# Patient Record
Sex: FEMALE | Race: WHITE | NOT HISPANIC OR LATINO
[De-identification: names, ages, dates, MRNs, and addresses within clinical notes are randomized per-mention and may not be internally consistent; named-entity substitution may affect disease eponyms.]

---

## 2017-02-14 ENCOUNTER — APPOINTMENT (OUTPATIENT)
Dept: CARDIOLOGY | Facility: CLINIC | Age: 67
End: 2017-02-14

## 2017-02-24 ENCOUNTER — EMERGENCY (EMERGENCY)
Facility: HOSPITAL | Age: 67
LOS: 1 days | End: 2017-02-24
Payer: MEDICARE

## 2017-02-24 PROCEDURE — 99284 EMERGENCY DEPT VISIT MOD MDM: CPT

## 2017-10-10 ENCOUNTER — APPOINTMENT (OUTPATIENT)
Dept: CARDIOLOGY | Facility: CLINIC | Age: 67
End: 2017-10-10
Payer: MEDICARE

## 2017-10-10 PROCEDURE — 93306 TTE W/DOPPLER COMPLETE: CPT

## 2017-10-10 PROCEDURE — 93880 EXTRACRANIAL BILAT STUDY: CPT

## 2017-10-16 ENCOUNTER — TRANSCRIPTION ENCOUNTER (OUTPATIENT)
Age: 67
End: 2017-10-16

## 2017-10-19 ENCOUNTER — APPOINTMENT (OUTPATIENT)
Dept: CARDIOLOGY | Facility: CLINIC | Age: 67
End: 2017-10-19
Payer: MEDICARE

## 2017-10-19 PROCEDURE — 99214 OFFICE O/P EST MOD 30 MIN: CPT

## 2017-10-19 PROCEDURE — 93000 ELECTROCARDIOGRAM COMPLETE: CPT

## 2017-12-21 ENCOUNTER — RX RENEWAL (OUTPATIENT)
Age: 67
End: 2017-12-21

## 2018-01-23 ENCOUNTER — MEDICATION RENEWAL (OUTPATIENT)
Age: 68
End: 2018-01-23

## 2018-03-27 ENCOUNTER — RECORD ABSTRACTING (OUTPATIENT)
Age: 68
End: 2018-03-27

## 2018-04-05 ENCOUNTER — APPOINTMENT (OUTPATIENT)
Dept: CARDIOLOGY | Facility: CLINIC | Age: 68
End: 2018-04-05
Payer: MEDICARE

## 2018-04-05 VITALS
OXYGEN SATURATION: 98 % | HEART RATE: 84 BPM | SYSTOLIC BLOOD PRESSURE: 120 MMHG | WEIGHT: 140 LBS | BODY MASS INDEX: 23.9 KG/M2 | HEIGHT: 64 IN | DIASTOLIC BLOOD PRESSURE: 70 MMHG

## 2018-04-05 PROCEDURE — 99214 OFFICE O/P EST MOD 30 MIN: CPT

## 2018-04-05 PROCEDURE — 93000 ELECTROCARDIOGRAM COMPLETE: CPT

## 2018-04-09 ENCOUNTER — MEDICATION RENEWAL (OUTPATIENT)
Age: 68
End: 2018-04-09

## 2018-07-11 ENCOUNTER — MEDICATION RENEWAL (OUTPATIENT)
Age: 68
End: 2018-07-11

## 2018-07-19 ENCOUNTER — MEDICATION RENEWAL (OUTPATIENT)
Age: 68
End: 2018-07-19

## 2018-09-07 ENCOUNTER — RX RENEWAL (OUTPATIENT)
Age: 68
End: 2018-09-07

## 2018-09-13 ENCOUNTER — MEDICATION RENEWAL (OUTPATIENT)
Age: 68
End: 2018-09-13

## 2018-10-09 ENCOUNTER — APPOINTMENT (OUTPATIENT)
Dept: CARDIOLOGY | Facility: CLINIC | Age: 68
End: 2018-10-09
Payer: MEDICARE

## 2018-10-09 PROCEDURE — 93880 EXTRACRANIAL BILAT STUDY: CPT

## 2018-10-09 PROCEDURE — 93306 TTE W/DOPPLER COMPLETE: CPT

## 2018-10-16 ENCOUNTER — RECORD ABSTRACTING (OUTPATIENT)
Age: 68
End: 2018-10-16

## 2018-10-18 ENCOUNTER — APPOINTMENT (OUTPATIENT)
Dept: CARDIOLOGY | Facility: CLINIC | Age: 68
End: 2018-10-18
Payer: MEDICARE

## 2018-10-18 VITALS
HEART RATE: 64 BPM | DIASTOLIC BLOOD PRESSURE: 70 MMHG | WEIGHT: 143 LBS | SYSTOLIC BLOOD PRESSURE: 110 MMHG | OXYGEN SATURATION: 98 % | HEIGHT: 64 IN | BODY MASS INDEX: 24.41 KG/M2

## 2018-10-18 PROCEDURE — 93000 ELECTROCARDIOGRAM COMPLETE: CPT

## 2018-10-18 PROCEDURE — 99214 OFFICE O/P EST MOD 30 MIN: CPT

## 2018-10-18 RX ORDER — METOPROLOL SUCCINATE 50 MG/1
50 TABLET, EXTENDED RELEASE ORAL
Refills: 0 | Status: DISCONTINUED | COMMUNITY
End: 2018-10-18

## 2018-11-13 ENCOUNTER — APPOINTMENT (OUTPATIENT)
Dept: CARDIOLOGY | Facility: CLINIC | Age: 68
End: 2018-11-13
Payer: MEDICARE

## 2018-11-16 ENCOUNTER — RECORD ABSTRACTING (OUTPATIENT)
Age: 68
End: 2018-11-16

## 2018-11-16 PROCEDURE — 93224 XTRNL ECG REC UP TO 48 HRS: CPT

## 2018-11-29 ENCOUNTER — APPOINTMENT (OUTPATIENT)
Dept: CARDIOLOGY | Facility: CLINIC | Age: 68
End: 2018-11-29
Payer: MEDICARE

## 2018-11-29 ENCOUNTER — RECORD ABSTRACTING (OUTPATIENT)
Age: 68
End: 2018-11-29

## 2018-11-29 VITALS
HEIGHT: 64 IN | OXYGEN SATURATION: 98 % | WEIGHT: 140 LBS | BODY MASS INDEX: 23.9 KG/M2 | DIASTOLIC BLOOD PRESSURE: 70 MMHG | SYSTOLIC BLOOD PRESSURE: 120 MMHG | HEART RATE: 62 BPM

## 2018-11-29 DIAGNOSIS — F51.01 PRIMARY INSOMNIA: ICD-10-CM

## 2018-11-29 DIAGNOSIS — E03.9 HYPOTHYROIDISM, UNSPECIFIED: ICD-10-CM

## 2018-11-29 PROCEDURE — 99214 OFFICE O/P EST MOD 30 MIN: CPT

## 2018-12-18 ENCOUNTER — RECORD ABSTRACTING (OUTPATIENT)
Age: 68
End: 2018-12-18

## 2018-12-20 ENCOUNTER — APPOINTMENT (OUTPATIENT)
Dept: CARDIOLOGY | Facility: CLINIC | Age: 68
End: 2018-12-20

## 2018-12-28 ENCOUNTER — MEDICATION RENEWAL (OUTPATIENT)
Age: 68
End: 2018-12-28

## 2018-12-29 ENCOUNTER — RX RENEWAL (OUTPATIENT)
Age: 68
End: 2018-12-29

## 2019-01-04 ENCOUNTER — RX RENEWAL (OUTPATIENT)
Age: 69
End: 2019-01-04

## 2019-01-30 ENCOUNTER — APPOINTMENT (OUTPATIENT)
Dept: CARDIOLOGY | Facility: CLINIC | Age: 69
End: 2019-01-30
Payer: MEDICARE

## 2019-01-30 VITALS
SYSTOLIC BLOOD PRESSURE: 130 MMHG | HEART RATE: 60 BPM | BODY MASS INDEX: 24.75 KG/M2 | WEIGHT: 145 LBS | HEIGHT: 64 IN | DIASTOLIC BLOOD PRESSURE: 76 MMHG

## 2019-01-30 DIAGNOSIS — R51 HEADACHE: ICD-10-CM

## 2019-01-30 DIAGNOSIS — F32.5 MAJOR DEPRESSIVE DISORDER, SINGLE EPISODE, IN FULL REMISSION: ICD-10-CM

## 2019-01-30 DIAGNOSIS — J30.2 OTHER SEASONAL ALLERGIC RHINITIS: ICD-10-CM

## 2019-01-30 PROCEDURE — 99214 OFFICE O/P EST MOD 30 MIN: CPT

## 2019-01-30 RX ORDER — SERTRALINE HYDROCHLORIDE 100 MG/1
100 TABLET, FILM COATED ORAL
Refills: 0 | Status: ACTIVE | COMMUNITY
Start: 2019-01-30

## 2019-01-30 RX ORDER — UBIDECARENONE/VIT E ACET 100MG-5
1000 CAPSULE ORAL
Refills: 0 | Status: ACTIVE | COMMUNITY

## 2019-01-30 RX ORDER — FEXOFENADINE HYDROCHLORIDE 180 MG/1
180 TABLET, FILM COATED ORAL
Refills: 0 | Status: ACTIVE | COMMUNITY

## 2019-01-30 RX ORDER — SERTRALINE HYDROCHLORIDE 100 MG/1
100 TABLET, FILM COATED ORAL DAILY
Refills: 0 | Status: DISCONTINUED | COMMUNITY
End: 2019-01-30

## 2019-01-30 RX ORDER — CALCIUM CARBONATE/VITAMIN D3 600MG-5MCG
600-200 TABLET ORAL DAILY
Refills: 0 | Status: ACTIVE | COMMUNITY

## 2019-01-30 RX ORDER — MOMETASONE FUROATE MONOHYDRATE 50 UG/1
50 SPRAY, METERED NASAL DAILY
Refills: 0 | Status: DISCONTINUED | COMMUNITY
End: 2019-01-30

## 2019-01-30 RX ORDER — PROPAFENONE HYDROCHLORIDE 225 MG/1
225 TABLET, FILM COATED ORAL
Qty: 180 | Refills: 1 | Status: DISCONTINUED | COMMUNITY
End: 2019-01-30

## 2019-01-30 RX ORDER — ALPRAZOLAM 0.25 MG/1
0.25 TABLET ORAL
Refills: 0 | Status: ACTIVE | COMMUNITY

## 2019-01-30 RX ORDER — WARFARIN 3 MG/1
3 TABLET ORAL
Refills: 0 | Status: ACTIVE | COMMUNITY
Start: 2019-01-30

## 2019-02-01 RX ORDER — CARVEDILOL 3.12 MG/1
3.12 TABLET, FILM COATED ORAL TWICE DAILY
Qty: 180 | Refills: 1 | Status: DISCONTINUED | COMMUNITY
Start: 2018-09-07 | End: 2019-02-01

## 2019-02-01 NOTE — DISCUSSION/SUMMARY
[FreeTextEntry1] : 68-year-old female past medical history as detailed above with the following recommendations:\par \par Atrial fibrillation, paroxysmal, documented on the Holter monitor or an event monitor. She is now him anticoagulation with warfarin tolerating it well.\par \par History of nonobstructive coronary artery disease, chest tightness and dyspnea on exertion, recommend nuclear stress testing.\par \par Hypertension presently well controlled on her current regimen.\par \par Hyperlipidemia, in the presence of nonobstructive CAD, should consider statin therapy.\par \par She will continue to follow with her endocrinologist for thyroid disease.\par \par She will have subsequent followup in the office after testing is completed.

## 2019-02-01 NOTE — PHYSICAL EXAM
[Normal Appearance] : normal appearance [Eyelids - No Xanthelasma] : the eyelids demonstrated no xanthelasmas [No Oral Pallor] : no oral pallor [Normal Jugular Venous V Waves Present] : normal jugular venous V waves present [Respiration, Rhythm And Depth] : normal respiratory rhythm and effort [Exaggerated Use Of Accessory Muscles For Inspiration] : no accessory muscle use [Auscultation Breath Sounds / Voice Sounds] : lungs were clear to auscultation bilaterally [Heart Rate And Rhythm] : heart rate and rhythm were normal [Heart Sounds] : normal S1 and S2 [Edema] : no peripheral edema present [Bowel Sounds] : normal bowel sounds [Abdomen Soft] : soft [Abdomen Tenderness] : non-tender [Abnormal Walk] : normal gait [Petechial Hemorrhages (___cm)] : no petechial hemorrhages [] : no rash [No Anxiety] : not feeling anxious

## 2019-02-01 NOTE — HISTORY OF PRESENT ILLNESS
[FreeTextEntry1] : \par 68-year-old female with past medical history significant for paroxysmal atrial fibrillation, minimal valvular regurgitation, factor XI deficiency, nonobstructive coronary artery disease, prior history of breast cancer.\par \par Recently she had a Holter monitor applied which revealed intermittent paroxysmal atrial fibrillation, minimum rate 51 and maximum 102.  She has rare to occasional premature atrial beats, rare sequentials, 2 to 9 beats at 148 beats per minute.  She had rare ventricular premature beats, no couplets.  \par She had no symptoms during monitoring period.  \par \par Echocardiogram 10-18 revealed normal chamber sizes, LVEF was normal at 60%.  She had no segmental wall motion abnormalities.  She had thickened mitral valve with minimal mitral regurgitation and thickened aortic valve with minimal to mild aortic valve regurgitation.  She had minimal tricuspid and pulmonic regurgitation and borderline pulmonary hypertension.\par \par She had no significant obstructive carotid disease by Doppler, antegrade flow with normal velocities in both vertebral arteries 10-18.\par \par \par She had lab work performed on November 19, 2018 where glucose, creatinine, and electrolytes were all within normal limits so were the liver enzymes, B12, and folic acid.  Her magnesium was normal at 2.3, folic acid over 20, HDL cholesterol 77, .  Thyroid was within normal limits.  Hemoglobin A1c was normal at 5.6.  CBC was unremarkable.  Urine was unremarkable. \par \par She is presently asymptomatic for atrial fibrillation and is maintained on Rythmol 325 mg q.12. She states she overall feels better on the higher dose of Rythmol. She is also on Toprol 100 mg daily, for ventricular rate control. She was started on anticoagulation with warfarin by her hematologist. She is tolerating this well without complications.\par \par She describes new chest discomfort, described as tightness with exertion. There is shortness of breath with exertion but no edema, PND, orthopnea, jaw or arm pain.\par \par SAVE NOTE\par She had episode of atrial fibrillation in 2008 when she was in [place]. She has been doing well since then. At that time, she also had an angiogram complicated by cardiac arrest. \par CT of the heart performed in 2010 showed normal coronaries and previous lesion about 10% in the mid LAD. She has multiple allergies to medicines including Aleve that gives her angioedema, and she sees allergist and pulmonologist, Dr. Correia. She also takes the Dulera. \par \par We reviewed all the medications, reconciled her list of medicines with ours. Her hypothyroidism is managed by endocrinologist, Dr. Huang. . \par For breast neoplasm, she sees Dr. Solomon for CA breast, [id number] carcinoma of left breast. \par She has factor XI deficiency. Her daughter also has a near form of Factor XI deficiency. \par She has headaches and visual disturbances and sees ophthalmologist, Dr. Roberts. \par She has had no significant attacks of PSVT on present medical regimen, and this has been stable. She will call us if she has any new symptoms. \par

## 2019-03-01 ENCOUNTER — APPOINTMENT (OUTPATIENT)
Dept: CARDIOLOGY | Facility: CLINIC | Age: 69
End: 2019-03-01
Payer: MEDICARE

## 2019-03-01 PROCEDURE — A9502: CPT

## 2019-03-01 PROCEDURE — 78452 HT MUSCLE IMAGE SPECT MULT: CPT

## 2019-03-01 PROCEDURE — 93015 CV STRESS TEST SUPVJ I&R: CPT

## 2019-03-01 RX ORDER — ZOLPIDEM TARTRATE 5 MG/1
5 TABLET ORAL
Qty: 30 | Refills: 0 | Status: DISCONTINUED | COMMUNITY
End: 2019-03-01

## 2019-03-01 RX ORDER — POTASSIUM CHLORIDE 750 MG/1
10 TABLET, EXTENDED RELEASE ORAL EVERY OTHER DAY
Qty: 45 | Refills: 3 | Status: DISCONTINUED | COMMUNITY
Start: 1900-01-01 | End: 2019-03-01

## 2019-03-07 ENCOUNTER — RX RENEWAL (OUTPATIENT)
Age: 69
End: 2019-03-07

## 2019-03-13 ENCOUNTER — APPOINTMENT (OUTPATIENT)
Dept: CARDIOLOGY | Facility: CLINIC | Age: 69
End: 2019-03-13
Payer: MEDICARE

## 2019-03-13 VITALS
HEART RATE: 65 BPM | OXYGEN SATURATION: 98 % | HEIGHT: 64 IN | SYSTOLIC BLOOD PRESSURE: 102 MMHG | WEIGHT: 142 LBS | DIASTOLIC BLOOD PRESSURE: 60 MMHG | BODY MASS INDEX: 24.24 KG/M2

## 2019-03-13 PROCEDURE — 99214 OFFICE O/P EST MOD 30 MIN: CPT

## 2019-03-13 RX ORDER — CARVEDILOL 3.12 MG/1
3.12 TABLET, FILM COATED ORAL TWICE DAILY
Refills: 0 | Status: DISCONTINUED | COMMUNITY
End: 2019-03-13

## 2019-04-03 NOTE — HISTORY OF PRESENT ILLNESS
[FreeTextEntry1] : \par 68-year-old female with past medical history significant for paroxysmal atrial fibrillation, minimal valvular regurgitation, factor XI deficiency, nonobstructive coronary artery disease, prior history of breast cancer.\par \par Recently she had a Holter monitor applied which revealed intermittent paroxysmal atrial fibrillation, minimum rate 51 and maximum 102.  She has rare to occasional premature atrial beats, rare sequentials, 2 to 9 beats at 148 beats per minute.  She had rare ventricular premature beats, no couplets.  \par She had no symptoms during monitoring period.  \par \par Echocardiogram 10-18 revealed normal chamber sizes, LVEF was normal at 60%.  She had no segmental wall motion abnormalities.  She had thickened mitral valve with minimal mitral regurgitation and thickened aortic valve with minimal to mild aortic valve regurgitation.  She had minimal tricuspid and pulmonic regurgitation and borderline pulmonary hypertension.\par \par She had no significant obstructive carotid disease by Doppler, antegrade flow with normal velocities in both vertebral arteries 10-18.\par \par \par She had lab work performed on November 19, 2018 where glucose, creatinine, and electrolytes were all within normal limits so were the liver enzymes, B12, and folic acid.  Her magnesium was normal at 2.3, folic acid over 20, HDL cholesterol 77, .  Thyroid was within normal limits.  Hemoglobin A1c was normal at 5.6.  CBC was unremarkable.  Urine was unremarkable. \par \par She is presently asymptomatic for atrial fibrillation and is maintained on Rythmol 325 mg q.12. She states she overall feels better on the higher dose of Rythmol. She is also on Toprol 100 mg daily, for ventricular rate control.   She is also on Coreg 3.125 BID, which she will stop. She was started on anticoagulation with warfarin by her hematologist. She is tolerating this well without complications.\par \par She described new chest discomfort, described as tightness with exertion. There is shortness of breath with exertion but no edema, PND, orthopnea, jaw or arm pain. \par \par She had nuclear stress test given the above symptoms on March 1, 2019, overall she had no evidence of ischemia by EKG. Myocardial perfusion scan revealed a small mild defect in the apical anteroseptal region which was fixed suggestive of breast attenuation artifact.\par \par In the interim she has not had recurrence of chest discomfort.\par \par SAVE NOTE\par She had episode of atrial fibrillation in 2008 when she was in [place]. She has been doing well since then. At that time, she also had an angiogram complicated by cardiac arrest. \par CT of the heart performed in 2010 showed normal coronaries and previous lesion about 10% in the mid LAD. She has multiple allergies to medicines including Aleve that gives her angioedema, and she sees allergist and pulmonologist, Dr. Correia. She also takes the Dulera. \par \par We reviewed all the medications, reconciled her list of medicines with ours. Her hypothyroidism is managed by endocrinologist, Dr. Huang. . \par For breast neoplasm, she sees Dr. Solomon for CA breast, T2N0M0 carcinoma of left breast. \par She has factor XI deficiency. Her daughter also has a near form of Factor XI deficiency. \par She has headaches and visual disturbances and sees ophthalmologist, Dr. Roberts. \par She has had no significant attacks of PSVT on present medical regimen, and this has been stable. She will call us if she has any new symptoms. \par

## 2019-04-03 NOTE — DISCUSSION/SUMMARY
[FreeTextEntry1] : 68-year-old female past medical history as detailed above with the following recommendations:\par \par Atrial fibrillation, paroxysmal, documented on the Holter monitor or an event monitor. She is now on anticoagulation with warfarin tolerating it well.\par \par History of nonobstructive coronary artery disease, chest tightness and dyspnea on exertion, this has not recurred. She had negative nuclear stress testing.\par \par Hypertension presently well controlled on her current regimen. Stop CoReg, as there is no need to be on two separate beta blockers. \par \par Hyperlipidemia, in the presence of nonobstructive CAD, should consider statin therapy, she will think about it.\par \par She will continue to follow with her endocrinologist for thyroid disease.\par \par She will have subsequent followup in 3-4 months or as needed for symptoms.

## 2019-06-05 ENCOUNTER — MEDICATION RENEWAL (OUTPATIENT)
Age: 69
End: 2019-06-05

## 2019-07-03 ENCOUNTER — APPOINTMENT (OUTPATIENT)
Dept: FAMILY MEDICINE | Facility: CLINIC | Age: 69
End: 2019-07-03

## 2019-07-10 ENCOUNTER — APPOINTMENT (OUTPATIENT)
Dept: CARDIOLOGY | Facility: CLINIC | Age: 69
End: 2019-07-10
Payer: MEDICARE

## 2019-07-10 VITALS
OXYGEN SATURATION: 99 % | DIASTOLIC BLOOD PRESSURE: 70 MMHG | BODY MASS INDEX: 24.24 KG/M2 | HEART RATE: 66 BPM | SYSTOLIC BLOOD PRESSURE: 132 MMHG | WEIGHT: 142 LBS | HEIGHT: 64 IN

## 2019-07-10 DIAGNOSIS — E87.6 HYPOKALEMIA: ICD-10-CM

## 2019-07-10 PROCEDURE — 99214 OFFICE O/P EST MOD 30 MIN: CPT

## 2019-07-10 RX ORDER — POTASSIUM CHLORIDE 1500 MG/1
20 TABLET, EXTENDED RELEASE ORAL
Qty: 45 | Refills: 3 | Status: DISCONTINUED | COMMUNITY
End: 2019-07-10

## 2019-07-10 NOTE — PHYSICAL EXAM
[Normal Appearance] : normal appearance [Eyelids - No Xanthelasma] : the eyelids demonstrated no xanthelasmas [Normal Jugular Venous V Waves Present] : normal jugular venous V waves present [No Oral Pallor] : no oral pallor [Respiration, Rhythm And Depth] : normal respiratory rhythm and effort [Auscultation Breath Sounds / Voice Sounds] : lungs were clear to auscultation bilaterally [Exaggerated Use Of Accessory Muscles For Inspiration] : no accessory muscle use [Edema] : no peripheral edema present [Heart Sounds] : normal S1 and S2 [Heart Rate And Rhythm] : heart rate and rhythm were normal [Bowel Sounds] : normal bowel sounds [Abdomen Soft] : soft [Abdomen Tenderness] : non-tender [Petechial Hemorrhages (___cm)] : no petechial hemorrhages [] : no rash [Abnormal Walk] : normal gait [No Anxiety] : not feeling anxious

## 2019-07-10 NOTE — DISCUSSION/SUMMARY
[FreeTextEntry1] : 69-year-old female past medical history as detailed above with the following recommendations:\par \par Atrial fibrillation, paroxysmal, documented on the Holter monitor or an event monitor. She is on anticoagulation with warfarin tolerating it well, normal CBC 3-19.\par \par History of nonobstructive coronary artery disease. She had negative nuclear stress testing. \par \par Hypertension presently well controlled on her current regimen. \par \par Hyperlipidemia, in the presence of nonobstructive CAD. She would like a CT calcium score to gauge if she would like to start statins. \par \par She will continue to follow with her endocrinologist for thyroid disease.\par \par She will be seeking new PCP.\par \par She will have subsequent followup in 3-4 months or as needed for symptoms.

## 2019-07-10 NOTE — HISTORY OF PRESENT ILLNESS
[FreeTextEntry1] : \par 69-year-old female with past medical history significant for paroxysmal atrial fibrillation, minimal valvular regurgitation, factor XI deficiency, nonobstructive coronary artery disease, prior history of breast cancer.\par \par Overall Johanne feels well. She denies any change in current symptoms. There is chronic mild HINTON with moderate to high levels of exertion.  She denies any chest pain, edema, PND, orthopnea, jaw or arm pain. \par \par Holter monitor applied 1-19 which revealed intermittent paroxysmal atrial fibrillation, minimum rate 51 and maximum 102.  She has rare to occasional premature atrial beats, rare sequentials, 2 to 9 beats at 148 beats per minute.  She had rare ventricular premature beats, no couplets.  \par She had no symptoms during monitoring period.  \par \par Echocardiogram 10-18 revealed normal chamber sizes, LVEF was normal at 60%.  She had no segmental wall motion abnormalities.  She had thickened mitral valve with minimal mitral regurgitation and thickened aortic valve with minimal to mild aortic valve regurgitation.  She had minimal tricuspid and pulmonic regurgitation and borderline pulmonary hypertension.\par \par She had no significant obstructive carotid disease by Doppler, antegrade flow with normal velocities in both vertebral arteries 10-18.\par \par \par She had lab work performed on 3-14-19 with normal CBC and  November 19, 2018 where glucose, creatinine, and electrolytes were all within normal limits so were the liver enzymes, B12, and folic acid.  Her magnesium was normal at 2.3, folic acid over 20, HDL cholesterol 77, .  Thyroid was within normal limits.  Hemoglobin A1c was normal at 5.6.  CBC was unremarkable.  Urine was unremarkable. \par \par She is presently asymptomatic for atrial fibrillation and is maintained on Rythmol 325 mg q.12. She states she overall feels better on the higher dose of Rythmol. She was also previously on Toprol 100 mg daily, and Coreg 3.125 BID, which was stopped. \par \par She was started on anticoagulation with warfarin by her hematologist. She is tolerating this well without complications.\par \par She had nuclear stress test, March 1, 2019, overall she had no evidence of ischemia by EKG. Myocardial perfusion scan revealed a small mild defect in the apical anteroseptal region which was fixed suggestive of breast attenuation artifact.\par \par \par \par SAVE NOTE\par She had episode of atrial fibrillation in 2008 when she was in [place]. She has been doing well since then. At that time, she also had an angiogram complicated by cardiac arrest. \par CT of the heart performed in 2010 showed normal coronaries and previous lesion about 10% in the mid LAD. She has multiple allergies to medicines including Aleve that gives her angioedema, and she sees allergist and pulmonologist, Dr. Correia. She also takes the Dulera. \par \par We reviewed all the medications, reconciled her list of medicines with ours. Her hypothyroidism is managed by endocrinologist, Dr. Huang. . \par For breast neoplasm, she sees Dr. Solomon for CA breast, T2N0M0 carcinoma of left breast. \par She has factor XI deficiency. Her daughter also has a near form of Factor XI deficiency. \par She has headaches and visual disturbances and sees ophthalmologist, Dr. Roberts. \par She has had no significant attacks of PSVT on present medical regimen, and this has been stable. She will call us if she has any new symptoms. \par

## 2019-08-22 ENCOUNTER — MEDICATION RENEWAL (OUTPATIENT)
Age: 69
End: 2019-08-22

## 2019-08-26 ENCOUNTER — RX RENEWAL (OUTPATIENT)
Age: 69
End: 2019-08-26

## 2019-09-26 ENCOUNTER — APPOINTMENT (OUTPATIENT)
Dept: CARDIOLOGY | Facility: CLINIC | Age: 69
End: 2019-09-26
Payer: MEDICARE

## 2019-09-26 ENCOUNTER — MEDICATION RENEWAL (OUTPATIENT)
Age: 69
End: 2019-09-26

## 2019-09-26 ENCOUNTER — NON-APPOINTMENT (OUTPATIENT)
Age: 69
End: 2019-09-26

## 2019-09-26 VITALS
WEIGHT: 142 LBS | HEIGHT: 64 IN | BODY MASS INDEX: 24.24 KG/M2 | HEART RATE: 55 BPM | OXYGEN SATURATION: 98 % | SYSTOLIC BLOOD PRESSURE: 118 MMHG | DIASTOLIC BLOOD PRESSURE: 70 MMHG

## 2019-09-26 PROCEDURE — 93000 ELECTROCARDIOGRAM COMPLETE: CPT

## 2019-09-26 PROCEDURE — 99215 OFFICE O/P EST HI 40 MIN: CPT | Mod: 25

## 2019-09-26 NOTE — DISCUSSION/SUMMARY
[FreeTextEntry1] : 1. non-obstructive CAD: discussed findings of her CT of the heart for CAC score. Total calcium score of 28 Agatston units and 10 year ASCVD Risk score of 9.72%. Discussed risks and benefits of starting statin therapy. She would like to be started on atorvastatin 20mg daily. Follow up labs in 3 months. \par \par 2. PAF: continue Rythmol 325mg BID, Toprol XL 100mg daily and Warfarin ( high risk medication with no signs of toxicity;Goal INR should be 2-3).\par \par 3. HTN: controlled on Toprol XL 100mg daily and triameterene-HCTZ. \par \par Follow up in 3 months.

## 2019-09-26 NOTE — PHYSICAL EXAM
[Well Groomed] : well groomed [General Appearance - Well Developed] : well developed [Normal Appearance] : normal appearance [General Appearance - Well Nourished] : well nourished [General Appearance - In No Acute Distress] : no acute distress [Normal Conjunctiva] : the conjunctiva exhibited no abnormalities [Eyelids - No Xanthelasma] : the eyelids demonstrated no xanthelasmas [Normal Oral Mucosa] : normal oral mucosa [No Oral Pallor] : no oral pallor [FreeTextEntry1] : No JVD, no carotid artery bruits auscultated bilaterally [No Oral Cyanosis] : no oral cyanosis [Respiration, Rhythm And Depth] : normal respiratory rhythm and effort [Exaggerated Use Of Accessory Muscles For Inspiration] : no accessory muscle use [Auscultation Breath Sounds / Voice Sounds] : lungs were clear to auscultation bilaterally [Heart Rate And Rhythm] : heart rate and rhythm were normal [Heart Sounds] : normal S1 and S2 [Murmurs] : no murmurs present [Edema] : no peripheral edema present [Abnormal Walk] : normal gait [Gait - Sufficient For Exercise Testing] : the gait was sufficient for exercise testing [Nail Clubbing] : no clubbing of the fingernails [Cyanosis, Localized] : no localized cyanosis [Skin Color & Pigmentation] : normal skin color and pigmentation [Skin Turgor] : normal skin turgor [] : no rash [Impaired Insight] : insight and judgment were intact [Affect] : the affect was normal [Memory Recent] : recent memory was not impaired

## 2019-09-26 NOTE — REVIEW OF SYSTEMS
[Dyspnea on exertion] : not dyspnea during exertion [Shortness Of Breath] : no shortness of breath [Lower Ext Edema] : no extremity edema [Chest Pain] : no chest pain [Palpitations] : no palpitations [Easy Bleeding] : no tendency for easy bleeding [Easy Bruising] : no tendency for easy bruising [Negative] : Endocrine

## 2019-12-18 ENCOUNTER — APPOINTMENT (OUTPATIENT)
Dept: CARDIOLOGY | Facility: CLINIC | Age: 69
End: 2019-12-18
Payer: MEDICARE

## 2019-12-18 VITALS
HEIGHT: 64 IN | OXYGEN SATURATION: 99 % | DIASTOLIC BLOOD PRESSURE: 70 MMHG | BODY MASS INDEX: 24.41 KG/M2 | HEART RATE: 62 BPM | SYSTOLIC BLOOD PRESSURE: 116 MMHG | WEIGHT: 143 LBS

## 2019-12-18 DIAGNOSIS — I34.0 NONRHEUMATIC MITRAL (VALVE) INSUFFICIENCY: ICD-10-CM

## 2019-12-18 PROCEDURE — 99215 OFFICE O/P EST HI 40 MIN: CPT

## 2019-12-18 RX ORDER — MOMETASONE FUROATE AND FORMOTEROL FUMARATE DIHYDRATE 200; 5 UG/1; UG/1
200-5 AEROSOL RESPIRATORY (INHALATION) TWICE DAILY
Refills: 0 | Status: DISCONTINUED | COMMUNITY
End: 2019-12-18

## 2019-12-18 RX ORDER — FLUTICASONE FUROATE, UMECLIDINIUM BROMIDE AND VILANTEROL TRIFENATATE 100; 62.5; 25 UG/1; UG/1; UG/1
100-62.5-25 POWDER RESPIRATORY (INHALATION)
Refills: 0 | Status: DISCONTINUED | COMMUNITY
End: 2019-12-18

## 2019-12-18 NOTE — DISCUSSION/SUMMARY
[FreeTextEntry1] : 1. non-obstructive CAD: discussed findings of her CT of the heart for CAC score. Total calcium score of 28 Agatston units and 10 year ASCVD Risk score of 9.72%. Tolerating atorvastatin 20mg daily.  LDL 75 on labs from 12/3/2019. Continue statin therapy.\par \par 2. PAF: continue Rythmol 325mg BID (high risk medication with no signs of toxicity), Toprol XL 100mg daily and Warfarin ( high risk medication with no signs of toxicity;Goal INR should be 2-3). No signs of ischemia or prior infarction on pharmacologic nuclear stress test performed March 2019. Normal LV systolic function on echocardiogram from October 2018.\par \par 3. HTN: controlled on Toprol XL 100mg daily and triameterene-HCTZ. \par \par Follow up in 6 months.

## 2019-12-18 NOTE — REVIEW OF SYSTEMS
[Shortness Of Breath] : no shortness of breath [Dyspnea on exertion] : not dyspnea during exertion [Chest Pain] : no chest pain [Lower Ext Edema] : no extremity edema [Palpitations] : no palpitations [Easy Bleeding] : no tendency for easy bleeding [Easy Bruising] : no tendency for easy bruising [Negative] : Endocrine

## 2019-12-18 NOTE — HISTORY OF PRESENT ILLNESS
[FreeTextEntry1] : 69 year old female with history of PAF, HLD, Factor XI deficiency, non-obstructive CAD, prior history of breast cancer presents for follow up. She has no chest pain, SOB, palpitations, abnormal bleeding.

## 2019-12-18 NOTE — PHYSICAL EXAM
[General Appearance - Well Developed] : well developed [Well Groomed] : well groomed [Normal Appearance] : normal appearance [General Appearance - In No Acute Distress] : no acute distress [General Appearance - Well Nourished] : well nourished [Normal Conjunctiva] : the conjunctiva exhibited no abnormalities [Eyelids - No Xanthelasma] : the eyelids demonstrated no xanthelasmas [Normal Oral Mucosa] : normal oral mucosa [No Oral Cyanosis] : no oral cyanosis [No Oral Pallor] : no oral pallor [FreeTextEntry1] : No JVD, no carotid artery bruits auscultated bilaterally [Respiration, Rhythm And Depth] : normal respiratory rhythm and effort [Exaggerated Use Of Accessory Muscles For Inspiration] : no accessory muscle use [Auscultation Breath Sounds / Voice Sounds] : lungs were clear to auscultation bilaterally [Heart Rate And Rhythm] : heart rate and rhythm were normal [Heart Sounds] : normal S1 and S2 [Murmurs] : no murmurs present [Edema] : no peripheral edema present [Abnormal Walk] : normal gait [Gait - Sufficient For Exercise Testing] : the gait was sufficient for exercise testing [Cyanosis, Localized] : no localized cyanosis [Nail Clubbing] : no clubbing of the fingernails [Skin Color & Pigmentation] : normal skin color and pigmentation [Skin Turgor] : normal skin turgor [] : no rash [Impaired Insight] : insight and judgment were intact [Affect] : the affect was normal [Memory Recent] : recent memory was not impaired

## 2019-12-27 ENCOUNTER — RX RENEWAL (OUTPATIENT)
Age: 69
End: 2019-12-27

## 2020-01-27 ENCOUNTER — RX RENEWAL (OUTPATIENT)
Age: 70
End: 2020-01-27

## 2020-05-13 ENCOUNTER — APPOINTMENT (OUTPATIENT)
Dept: CARDIOLOGY | Facility: CLINIC | Age: 70
End: 2020-05-13

## 2020-06-04 ENCOUNTER — NON-APPOINTMENT (OUTPATIENT)
Age: 70
End: 2020-06-04

## 2020-06-04 ENCOUNTER — APPOINTMENT (OUTPATIENT)
Dept: CARDIOLOGY | Facility: CLINIC | Age: 70
End: 2020-06-04
Payer: MEDICARE

## 2020-06-04 VITALS
WEIGHT: 145 LBS | TEMPERATURE: 98.4 F | BODY MASS INDEX: 24.75 KG/M2 | SYSTOLIC BLOOD PRESSURE: 120 MMHG | HEART RATE: 55 BPM | OXYGEN SATURATION: 95 % | HEIGHT: 64 IN | DIASTOLIC BLOOD PRESSURE: 70 MMHG

## 2020-06-04 PROCEDURE — 99215 OFFICE O/P EST HI 40 MIN: CPT | Mod: 25

## 2020-06-04 PROCEDURE — 93000 ELECTROCARDIOGRAM COMPLETE: CPT

## 2020-06-04 RX ORDER — FEXOFENADINE HYDROCHLORIDE 180 MG/1
180 TABLET ORAL DAILY
Refills: 0 | Status: DISCONTINUED | COMMUNITY
End: 2020-06-04

## 2020-06-04 RX ORDER — POTASSIUM CHLORIDE 750 MG/1
10 TABLET, EXTENDED RELEASE ORAL DAILY
Qty: 30 | Refills: 6 | Status: DISCONTINUED | COMMUNITY
End: 2020-06-04

## 2020-06-04 NOTE — PHYSICAL EXAM
[General Appearance - Well Developed] : well developed [Normal Appearance] : normal appearance [General Appearance - Well Nourished] : well nourished [Well Groomed] : well groomed [General Appearance - In No Acute Distress] : no acute distress [Normal Conjunctiva] : the conjunctiva exhibited no abnormalities [Normal Oral Mucosa] : normal oral mucosa [Eyelids - No Xanthelasma] : the eyelids demonstrated no xanthelasmas [No Oral Pallor] : no oral pallor [No Oral Cyanosis] : no oral cyanosis [FreeTextEntry1] : No JVD, no carotid artery bruits auscultated bilaterally [Respiration, Rhythm And Depth] : normal respiratory rhythm and effort [Exaggerated Use Of Accessory Muscles For Inspiration] : no accessory muscle use [Auscultation Breath Sounds / Voice Sounds] : lungs were clear to auscultation bilaterally [Heart Sounds] : normal S1 and S2 [Heart Rate And Rhythm] : heart rate and rhythm were normal [Edema] : no peripheral edema present [Murmurs] : no murmurs present [Abnormal Walk] : normal gait [Gait - Sufficient For Exercise Testing] : the gait was sufficient for exercise testing [Cyanosis, Localized] : no localized cyanosis [Nail Clubbing] : no clubbing of the fingernails [Skin Color & Pigmentation] : normal skin color and pigmentation [] : no rash [Skin Turgor] : normal skin turgor [Impaired Insight] : insight and judgment were intact [Affect] : the affect was normal [Memory Recent] : recent memory was not impaired

## 2020-06-04 NOTE — DISCUSSION/SUMMARY
[FreeTextEntry1] : 1. non-obstructive CAD: discussed findings of her CT of the heart for CAC score. Total calcium score of 28 Agatston units and 10 year ASCVD Risk score of 9.72%. Tolerating atorvastatin 10mg daily.  LDL 75 on labs from 12/3/2019 (while she was on 20mg daily, dose was decreased because patient thought it was causing her hair loss). Continue statin therapy at 10mg daily. Will recheck lipid panel. If LDL climbing she is agreeable to increase dose back to 20mg daily. \par \par 2. PAF: continue Rythmol 325mg BID (high risk medication with no signs of toxicity), Toprol XL 100mg daily and Warfarin ( high risk medication with no signs of toxicity;Goal INR should be 2-3). No signs of ischemia or prior infarction on pharmacologic nuclear stress test performed March 2019. Normal LV systolic function on echocardiogram from October 2018. INR being followed by hematologist. Follow up with EP regarding results of event monitor. \par \par 3. HTN: controlled on Toprol XL 100mg daily and triameterene-HCTZ. \par \par Follow up in 6 months.

## 2020-06-04 NOTE — HISTORY OF PRESENT ILLNESS
[FreeTextEntry1] : 69 year old female with history of PAF, HLD, Factor XI deficiency, non-obstructive CAD, prior history of breast cancer presents for follow up. She has no chest pain, SOB, abnormal bleeding. In May she developed palpitations described as racing and fluttering. She states her HR was sustained in the 130s for about 90 minutes. No dizziness or lightheadedness. She went to see her electrophysiologist, Dr. Chand, who ordered a 30 Day event monitor which she is currently wearing. No events since wearing the device. \par \par

## 2020-09-23 ENCOUNTER — RX RENEWAL (OUTPATIENT)
Age: 70
End: 2020-09-23

## 2020-09-23 RX ORDER — POTASSIUM CHLORIDE 750 MG/1
10 TABLET, FILM COATED, EXTENDED RELEASE ORAL
Refills: 1 | Status: DISCONTINUED | COMMUNITY
End: 2020-09-23

## 2020-12-24 ENCOUNTER — RX RENEWAL (OUTPATIENT)
Age: 70
End: 2020-12-24

## 2021-01-20 ENCOUNTER — TRANSCRIPTION ENCOUNTER (OUTPATIENT)
Age: 71
End: 2021-01-20

## 2021-02-03 ENCOUNTER — RX RENEWAL (OUTPATIENT)
Age: 71
End: 2021-02-03

## 2021-02-26 ENCOUNTER — APPOINTMENT (OUTPATIENT)
Dept: CARDIOLOGY | Facility: CLINIC | Age: 71
End: 2021-02-26

## 2021-03-12 ENCOUNTER — RESULT REVIEW (OUTPATIENT)
Age: 71
End: 2021-03-12

## 2021-05-01 ENCOUNTER — APPOINTMENT (OUTPATIENT)
Dept: DISASTER EMERGENCY | Facility: CLINIC | Age: 71
End: 2021-05-01

## 2021-05-02 LAB — SARS-COV-2 N GENE NPH QL NAA+PROBE: NOT DETECTED

## 2021-05-04 ENCOUNTER — OUTPATIENT (OUTPATIENT)
Dept: OUTPATIENT SERVICES | Facility: HOSPITAL | Age: 71
LOS: 1 days | End: 2021-05-04

## 2021-06-15 ENCOUNTER — APPOINTMENT (OUTPATIENT)
Dept: OPHTHALMOLOGY | Facility: HOSPITAL | Age: 71
End: 2021-06-15

## 2021-06-16 ENCOUNTER — APPOINTMENT (OUTPATIENT)
Dept: OPHTHALMOLOGY | Facility: CLINIC | Age: 71
End: 2021-06-16

## 2021-06-21 ENCOUNTER — APPOINTMENT (OUTPATIENT)
Dept: OPHTHALMOLOGY | Facility: CLINIC | Age: 71
End: 2021-06-21

## 2021-07-13 ENCOUNTER — APPOINTMENT (OUTPATIENT)
Dept: OPHTHALMOLOGY | Facility: HOSPITAL | Age: 71
End: 2021-07-13
Payer: MEDICARE

## 2021-07-13 ENCOUNTER — OUTPATIENT (OUTPATIENT)
Dept: OUTPATIENT SERVICES | Facility: HOSPITAL | Age: 71
LOS: 1 days | End: 2021-07-13

## 2021-07-13 PROCEDURE — 66984 XCAPSL CTRC RMVL W/O ECP: CPT | Mod: 79,RT

## 2021-07-14 ENCOUNTER — NON-APPOINTMENT (OUTPATIENT)
Age: 71
End: 2021-07-14

## 2021-07-14 ENCOUNTER — APPOINTMENT (OUTPATIENT)
Dept: OPHTHALMOLOGY | Facility: CLINIC | Age: 71
End: 2021-07-14
Payer: MEDICARE

## 2021-07-14 PROCEDURE — 99024 POSTOP FOLLOW-UP VISIT: CPT

## 2021-07-19 ENCOUNTER — NON-APPOINTMENT (OUTPATIENT)
Age: 71
End: 2021-07-19

## 2021-07-19 ENCOUNTER — APPOINTMENT (OUTPATIENT)
Dept: OPHTHALMOLOGY | Facility: CLINIC | Age: 71
End: 2021-07-19
Payer: MEDICARE

## 2021-07-19 PROCEDURE — 99024 POSTOP FOLLOW-UP VISIT: CPT

## 2021-08-07 ENCOUNTER — RX RENEWAL (OUTPATIENT)
Age: 71
End: 2021-08-07

## 2021-09-13 ENCOUNTER — APPOINTMENT (OUTPATIENT)
Dept: OPHTHALMOLOGY | Facility: CLINIC | Age: 71
End: 2021-09-13
Payer: MEDICARE

## 2021-09-13 ENCOUNTER — NON-APPOINTMENT (OUTPATIENT)
Age: 71
End: 2021-09-13

## 2021-09-13 PROCEDURE — 99024 POSTOP FOLLOW-UP VISIT: CPT

## 2021-11-16 ENCOUNTER — NON-APPOINTMENT (OUTPATIENT)
Age: 71
End: 2021-11-16

## 2021-11-16 ENCOUNTER — APPOINTMENT (OUTPATIENT)
Dept: CARDIOLOGY | Facility: CLINIC | Age: 71
End: 2021-11-16
Payer: MEDICARE

## 2021-11-16 VITALS
TEMPERATURE: 97.3 F | HEART RATE: 55 BPM | OXYGEN SATURATION: 98 % | SYSTOLIC BLOOD PRESSURE: 120 MMHG | DIASTOLIC BLOOD PRESSURE: 82 MMHG | WEIGHT: 136 LBS | HEIGHT: 64 IN | BODY MASS INDEX: 23.22 KG/M2

## 2021-11-16 PROCEDURE — 93000 ELECTROCARDIOGRAM COMPLETE: CPT

## 2021-11-16 PROCEDURE — 99215 OFFICE O/P EST HI 40 MIN: CPT

## 2021-11-16 RX ORDER — TRIAMTERENE AND HYDROCHLOROTHIAZIDE 37.5; 25 MG/1; MG/1
37.5-25 CAPSULE ORAL
Qty: 30 | Refills: 0 | Status: DISCONTINUED | COMMUNITY
Start: 2018-12-29 | End: 2021-11-16

## 2021-11-16 RX ORDER — ATORVASTATIN CALCIUM 10 MG/1
10 TABLET, FILM COATED ORAL
Qty: 90 | Refills: 3 | Status: DISCONTINUED | COMMUNITY
Start: 2019-09-26 | End: 2021-11-16

## 2021-11-16 NOTE — REVIEW OF SYSTEMS
[Tremor] : a tremor was seen [Negative] : Heme/Lymph [Dizziness] : no dizziness [Numbness (Hypoesthesia)] : no numbness [Convulsions] : no convulsions [Tingling (Paresthesia)] : no tingling [Weakness] : no weakness [Limb Weakness (Paresis)] : no limb weakness (Paresis) [Speech Disturbance] : no speech disturbance

## 2021-11-16 NOTE — PHYSICAL EXAM
[General Appearance - Well Developed] : well developed [Normal Appearance] : normal appearance [Well Groomed] : well groomed [General Appearance - In No Acute Distress] : no acute distress [General Appearance - Well Nourished] : well nourished [Normal Conjunctiva] : the conjunctiva exhibited no abnormalities [Eyelids - No Xanthelasma] : the eyelids demonstrated no xanthelasmas [Normal Oral Mucosa] : normal oral mucosa [No Oral Pallor] : no oral pallor [No Oral Cyanosis] : no oral cyanosis [Respiration, Rhythm And Depth] : normal respiratory rhythm and effort [Exaggerated Use Of Accessory Muscles For Inspiration] : no accessory muscle use [Auscultation Breath Sounds / Voice Sounds] : lungs were clear to auscultation bilaterally [Heart Rate And Rhythm] : heart rate and rhythm were normal [Heart Sounds] : normal S1 and S2 [Murmurs] : no murmurs present [Edema] : no peripheral edema present [Abnormal Walk] : normal gait [Gait - Sufficient For Exercise Testing] : the gait was sufficient for exercise testing [Nail Clubbing] : no clubbing of the fingernails [Cyanosis, Localized] : no localized cyanosis [Skin Color & Pigmentation] : normal skin color and pigmentation [Skin Turgor] : normal skin turgor [] : no rash [Impaired Insight] : insight and judgment were intact [Affect] : the affect was normal [Memory Recent] : recent memory was not impaired [FreeTextEntry1] : No JVD, no carotid artery bruits auscultated bilaterally

## 2021-11-16 NOTE — REASON FOR VISIT
[Arrhythmia/ECG Abnorrmalities] : arrhythmia/ECG abnormalities [Follow-Up - Clinic] : a clinic follow-up of [Coronary Artery Disease] : coronary artery disease [FreeTextEntry1] : I saw this 71yr. old year female in f/u on 11/16/21.\par  history of PAF, HLD, Factor XI deficiency, non-obstructive CAD, prior history of breast cancer presents for follow up. She has no chest pain, SOB, abnormal bleeding. she developed palpitations described as racing and fluttering. She states her HR was sustained in the 130s for about 90 minutes. No dizziness or lightheadedness. She went to see her electrophysiologist,\par Currently on high-dose metoprolol and Rythmol, she has remained in sinus rhythm, bradycardic, with no further episodes of tachycardia.\par She has developed an intention tremor which has not responded to any form of treatment\par Previous cardiac cath showed nonobstructive coronary disease\par

## 2021-11-16 NOTE — DISCUSSION/SUMMARY
[FreeTextEntry1] : 1. non-obstructive CAD: discussed findings of her CT of the heart for CAC score. Total calcium score of 28 Agatston units and 10 year ASCVD Risk score of 9.72%. Tolerating atorvastatin 10mg daily.  LDL 75 on labs from 12/3/2019 (while she was on 20mg daily, dose was decreased because patient thought it was causing her hair loss). Continue statin therapy at 10mg daily. Will recheck lipid panel. If LDL climbing she is agreeable to increase dose back to 20mg daily. \par \par 2. PAF: continue Rythmol 325mg BID (high risk medication with no signs of toxicity), Toprol XL 250mg daily and Warfarin ( high risk medication with no signs of toxicity;Goal INR should be 2-3). No signs of ischemia or prior infarction on pharmacologic nuclear stress test performed March 2019. Normal LV systolic function on echocardiogram from October 2018. INR being followed by hematologist. Follow up with EP regarding results of event monitor. \par \par 3. HTN: controlled on Toprol XL 250mg daily and triameterene-HCTZ. \par \par Follow up in 6 months.

## 2021-12-13 ENCOUNTER — APPOINTMENT (OUTPATIENT)
Dept: OPHTHALMOLOGY | Facility: CLINIC | Age: 71
End: 2021-12-13
Payer: MEDICARE

## 2021-12-13 ENCOUNTER — NON-APPOINTMENT (OUTPATIENT)
Age: 71
End: 2021-12-13

## 2021-12-13 PROCEDURE — 92014 COMPRE OPH EXAM EST PT 1/>: CPT

## 2022-01-10 ENCOUNTER — RX RENEWAL (OUTPATIENT)
Age: 72
End: 2022-01-10

## 2022-01-31 ENCOUNTER — APPOINTMENT (OUTPATIENT)
Dept: CARDIOLOGY | Facility: CLINIC | Age: 72
End: 2022-01-31
Payer: MEDICARE

## 2022-01-31 ENCOUNTER — NON-APPOINTMENT (OUTPATIENT)
Age: 72
End: 2022-01-31

## 2022-01-31 VITALS
SYSTOLIC BLOOD PRESSURE: 158 MMHG | HEIGHT: 64 IN | OXYGEN SATURATION: 100 % | HEART RATE: 57 BPM | DIASTOLIC BLOOD PRESSURE: 82 MMHG | TEMPERATURE: 97.1 F

## 2022-01-31 DIAGNOSIS — Z01.818 ENCOUNTER FOR OTHER PREPROCEDURAL EXAMINATION: ICD-10-CM

## 2022-01-31 PROCEDURE — 93000 ELECTROCARDIOGRAM COMPLETE: CPT

## 2022-01-31 PROCEDURE — 99215 OFFICE O/P EST HI 40 MIN: CPT

## 2022-01-31 RX ORDER — MULTIVIT-MIN/IRON/FOLIC ACID/K 18-600-40
400 CAPSULE ORAL
Refills: 0 | Status: DISCONTINUED | COMMUNITY
End: 2022-01-31

## 2022-01-31 RX ORDER — GABAPENTIN 300 MG/1
300 CAPSULE ORAL
Refills: 0 | Status: DISCONTINUED | COMMUNITY
End: 2022-01-31

## 2022-01-31 NOTE — REVIEW OF SYSTEMS
[Tremor] : a tremor was seen [Negative] : Heme/Lymph [SOB] : shortness of breath [Palpitations] : palpitations [Dizziness] : no dizziness [Numbness (Hypoesthesia)] : no numbness [Convulsions] : no convulsions [Tingling (Paresthesia)] : no tingling [Weakness] : no weakness [Limb Weakness (Paresis)] : no limb weakness (Paresis) [Speech Disturbance] : no speech disturbance [FreeTextEntry5] : with episodes of Afib

## 2022-01-31 NOTE — DISCUSSION/SUMMARY
[FreeTextEntry1] : CLEMENTINE MATOS is a 71 year old F who presents today Jan 31, 2022 with the above history and the following active issues: \par \par 1. non-obstructive CAD: discussed findings of her CT of the heart for CAC score. Total calcium score of 28 Agatston units. Tolerating simvastatin 40mg.   on labs from 8/2021 ( she trailed atorva and had myalgias and bone pain). \par \par 2. PAF: Now with increasing symptoms and frequency. We will continue Rythmol 425mg BID (high risk medication with no signs of toxicity), Toprol XL 200mg daily and Warfarin ( high risk medication with no signs of toxicity; Goal INR should be 2-3. Monitored at hematology). No signs of ischemia or prior infarction on pharmacologic nuclear stress test performed March 2019. Normal LV systolic function on echocardiogram from October 2018.  Reviewed rhythm strip from her home monitoring rate 140s\par - Consult with EP for options and pt would like to discuss ablation\par - Repeat Nuclear stress testing since now with Chest pressure/tightness SOB with episodes\par -Pt was instructed if her episodes are lasting >30-40 min she needs to seek emergency medical attention.\par - Symptoms of chest tightness/pressure likely to be Afib related and unlikely CAD. Last ca score showing LAD 28- nonobstructive CAD \par \par 3. HTN: not controlled on Toprol XL 200mg daily and triameterene-HCTZ QOD. Will have her start taking her \par triameterene-HCTZ daily with her K+ supp daily and check BMP/Mag. She was previously on Mag but has stopped. If low will need to restart magnesium and monitor levels\par \par Consult with EP and Nuclear Stress Test\par Follow up with us in 6 months\par \par Sincerely,\par \par Ashly Wakefield ANP-C\par Patients history, testing, and plan reviewed with supervising MD: Dr. Juan Francis

## 2022-01-31 NOTE — ASSESSMENT
[FreeTextEntry1] : Testing Reviewed:\par EKG 1/2022: SB 51 non-specific St T segment changes. Poss old anteroseptal infarct\par Labs 9/1/2021: HGB 13.4 HCT 41.0  Creat 0.80 Ast 21 ALT 18 Total Chol 217 HDL 72  TSH 1.560 Mag 2.2 \par 7/2019 CT Tomy Score: LAD 28 . Total calcium score of 28 Agatston\par 3/2019 Nuclear Stress: Negative for Pharm induced ischemia. Small mild defect in the apical anteroseptal region that is fixed, sugg of breast attenuation. LVEf 65% \par \par \par \par

## 2022-01-31 NOTE — PHYSICAL EXAM
[General Appearance - Well Developed] : well developed [Normal Appearance] : normal appearance [Well Groomed] : well groomed [General Appearance - Well Nourished] : well nourished [General Appearance - In No Acute Distress] : no acute distress [Normal Conjunctiva] : the conjunctiva exhibited no abnormalities [Eyelids - No Xanthelasma] : the eyelids demonstrated no xanthelasmas [Normal Oral Mucosa] : normal oral mucosa [No Oral Pallor] : no oral pallor [No Oral Cyanosis] : no oral cyanosis [Respiration, Rhythm And Depth] : normal respiratory rhythm and effort [Exaggerated Use Of Accessory Muscles For Inspiration] : no accessory muscle use [Auscultation Breath Sounds / Voice Sounds] : lungs were clear to auscultation bilaterally [Heart Rate And Rhythm] : heart rate and rhythm were normal [Heart Sounds] : normal S1 and S2 [Murmurs] : no murmurs present [Edema] : no peripheral edema present [Abnormal Walk] : normal gait [Gait - Sufficient For Exercise Testing] : the gait was sufficient for exercise testing [Nail Clubbing] : no clubbing of the fingernails [Cyanosis, Localized] : no localized cyanosis [Skin Color & Pigmentation] : normal skin color and pigmentation [Skin Turgor] : normal skin turgor [] : no rash [Impaired Insight] : insight and judgment were intact [Affect] : the affect was normal [Memory Recent] : recent memory was not impaired [FreeTextEntry1] : No JVD, no carotid artery bruits auscultated bilaterally

## 2022-01-31 NOTE — REASON FOR VISIT
[Arrhythmia/ECG Abnorrmalities] : arrhythmia/ECG abnormalities [Follow-Up - Clinic] : a clinic follow-up of [Coronary Artery Disease] : coronary artery disease [FreeTextEntry1] : Johanne is a 71 y.o female with  history of PAF, HLD, Factor XI deficiency, non-obstructive CAD, prior history of breast cancer. \par \par She was last seen on 11/16/2021. In the interim there have not been any hospitalizations or procedures. She does complain of increased in frequency and duration of her PAF. Over this weekend she had >5 episodes of Afib lasting >20min. She feels the symptoms are worsening when she is in AFib. She now is having SOB, dizziness, palpitations, chest pressure and "not feeling right" with her episodes. She would like to see EP for an evaluation. These episodes are happening when she is reading a book or knitting. there has not been any change in her routines. She denies chest pain, pressure, orthopnea, LE edema, lightheadedness, dizziness, near syncope or syncope when she is not having PAF. \par She recently saw her Neurologist and he decreased the Metoprolol  <1 week to 200mg and added Propanolol 20mg for her tremors.  \par \par Currently on high-dose metoprolol and Rythmol. Coumadin dosing as per hematology. last INR was 2.2 on Thursday. Has Hx of Factor 11 def. \par \par Previous cardiac cath showed nonobstructive coronary disease ( will try to obtain report). She had the angiogram in 2008 in [place] at which she had cardiac arrest the patient reports? \par

## 2022-02-07 ENCOUNTER — APPOINTMENT (OUTPATIENT)
Dept: CARDIOLOGY | Facility: CLINIC | Age: 72
End: 2022-02-07
Payer: MEDICARE

## 2022-02-07 PROCEDURE — 93015 CV STRESS TEST SUPVJ I&R: CPT

## 2022-02-07 PROCEDURE — A9502: CPT

## 2022-02-07 PROCEDURE — 78452 HT MUSCLE IMAGE SPECT MULT: CPT

## 2022-02-16 ENCOUNTER — NON-APPOINTMENT (OUTPATIENT)
Age: 72
End: 2022-02-16

## 2022-02-16 ENCOUNTER — APPOINTMENT (OUTPATIENT)
Dept: ELECTROPHYSIOLOGY | Facility: CLINIC | Age: 72
End: 2022-02-16
Payer: MEDICARE

## 2022-02-16 VITALS
HEIGHT: 64 IN | OXYGEN SATURATION: 95 % | HEART RATE: 44 BPM | SYSTOLIC BLOOD PRESSURE: 140 MMHG | DIASTOLIC BLOOD PRESSURE: 74 MMHG | WEIGHT: 138 LBS | TEMPERATURE: 97.3 F | BODY MASS INDEX: 23.56 KG/M2

## 2022-02-16 DIAGNOSIS — R00.2 PALPITATIONS: ICD-10-CM

## 2022-02-16 PROCEDURE — 99204 OFFICE O/P NEW MOD 45 MIN: CPT

## 2022-02-16 PROCEDURE — 93246 EXT ECG>7D<15D RECORDING: CPT

## 2022-02-16 PROCEDURE — 93000 ELECTROCARDIOGRAM COMPLETE: CPT | Mod: 59

## 2022-03-14 ENCOUNTER — OUTPATIENT (OUTPATIENT)
Dept: OUTPATIENT SERVICES | Facility: HOSPITAL | Age: 72
LOS: 1 days | End: 2022-03-14

## 2022-03-14 ENCOUNTER — EMERGENCY (EMERGENCY)
Facility: HOSPITAL | Age: 72
LOS: 1 days | Discharge: HOME | End: 2022-03-14
Admitting: STUDENT IN AN ORGANIZED HEALTH CARE EDUCATION/TRAINING PROGRAM
Payer: MEDICARE

## 2022-03-14 DIAGNOSIS — Z90.11 ACQUIRED ABSENCE OF RIGHT BREAST AND NIPPLE: ICD-10-CM

## 2022-03-14 DIAGNOSIS — I48.0 PAROXYSMAL ATRIAL FIBRILLATION: ICD-10-CM

## 2022-03-14 DIAGNOSIS — Z85.3 PERSONAL HISTORY OF MALIGNANT NEOPLASM OF BREAST: ICD-10-CM

## 2022-03-14 DIAGNOSIS — I10 ESSENTIAL (PRIMARY) HYPERTENSION: ICD-10-CM

## 2022-03-14 DIAGNOSIS — E78.5 HYPERLIPIDEMIA, UNSPECIFIED: ICD-10-CM

## 2022-03-14 DIAGNOSIS — Z92.21 PERSONAL HISTORY OF ANTINEOPLASTIC CHEMOTHERAPY: ICD-10-CM

## 2022-03-14 DIAGNOSIS — R00.2 PALPITATIONS: ICD-10-CM

## 2022-03-14 DIAGNOSIS — Z95.810 PRESENCE OF AUTOMATIC (IMPLANTABLE) CARDIAC DEFIBRILLATOR: ICD-10-CM

## 2022-03-14 DIAGNOSIS — F32.A DEPRESSION, UNSPECIFIED: ICD-10-CM

## 2022-03-14 DIAGNOSIS — J45.909 UNSPECIFIED ASTHMA, UNCOMPLICATED: ICD-10-CM

## 2022-03-14 DIAGNOSIS — F41.9 ANXIETY DISORDER, UNSPECIFIED: ICD-10-CM

## 2022-03-14 DIAGNOSIS — Z79.01 LONG TERM (CURRENT) USE OF ANTICOAGULANTS: ICD-10-CM

## 2022-03-14 DIAGNOSIS — E03.9 HYPOTHYROIDISM, UNSPECIFIED: ICD-10-CM

## 2022-03-14 DIAGNOSIS — R77.8 OTHER SPECIFIED ABNORMALITIES OF PLASMA PROTEINS: ICD-10-CM

## 2022-03-14 DIAGNOSIS — I47.1 SUPRAVENTRICULAR TACHYCARDIA: ICD-10-CM

## 2022-03-14 PROCEDURE — 93010 ELECTROCARDIOGRAM REPORT: CPT | Mod: 76

## 2022-03-14 PROCEDURE — 71045 X-RAY EXAM CHEST 1 VIEW: CPT | Mod: 26

## 2022-03-14 PROCEDURE — 99284 EMERGENCY DEPT VISIT MOD MDM: CPT

## 2022-03-15 PROCEDURE — 93010 ELECTROCARDIOGRAM REPORT: CPT

## 2022-03-15 PROCEDURE — 93306 TTE W/DOPPLER COMPLETE: CPT | Mod: 26

## 2022-03-16 ENCOUNTER — APPOINTMENT (OUTPATIENT)
Dept: ELECTROPHYSIOLOGY | Facility: CLINIC | Age: 72
End: 2022-03-16
Payer: MEDICARE

## 2022-03-16 VITALS
HEIGHT: 64 IN | DIASTOLIC BLOOD PRESSURE: 80 MMHG | OXYGEN SATURATION: 97 % | TEMPERATURE: 97.1 F | WEIGHT: 138 LBS | HEART RATE: 68 BPM | BODY MASS INDEX: 23.56 KG/M2 | SYSTOLIC BLOOD PRESSURE: 134 MMHG

## 2022-03-16 PROCEDURE — 99214 OFFICE O/P EST MOD 30 MIN: CPT

## 2022-03-16 PROCEDURE — 93000 ELECTROCARDIOGRAM COMPLETE: CPT

## 2022-03-16 RX ORDER — BUTALBITAL, ACETAMINOPHEN, AND CAFFEINE 50; 300; 40 MG/1; MG/1; MG/1
50-300-40 CAPSULE ORAL
Refills: 0 | Status: DISCONTINUED | COMMUNITY
End: 2022-03-16

## 2022-03-23 ENCOUNTER — NON-APPOINTMENT (OUTPATIENT)
Age: 72
End: 2022-03-23

## 2022-03-24 ENCOUNTER — APPOINTMENT (OUTPATIENT)
Dept: CARDIOLOGY | Facility: CLINIC | Age: 72
End: 2022-03-24
Payer: MEDICARE

## 2022-03-24 VITALS
OXYGEN SATURATION: 99 % | WEIGHT: 138 LBS | HEART RATE: 55 BPM | SYSTOLIC BLOOD PRESSURE: 122 MMHG | DIASTOLIC BLOOD PRESSURE: 60 MMHG | TEMPERATURE: 97.1 F | BODY MASS INDEX: 23.56 KG/M2 | HEIGHT: 64 IN

## 2022-03-24 PROCEDURE — 99215 OFFICE O/P EST HI 40 MIN: CPT

## 2022-03-24 NOTE — PHYSICAL EXAM
[No Acute Distress] : no acute distress [Normal Venous Pressure] : normal venous pressure [Normal S1, S2] : normal S1, S2 [No Murmur] : no murmur [Clear Lung Fields] : clear lung fields [Normal Gait] : normal gait [No Edema] : no edema [No Rash] : no rash [No Focal Deficits] : no focal deficits [Alert and Oriented] : alert and oriented [de-identified] : Conjunctival hemorrhage

## 2022-03-24 NOTE — DISCUSSION/SUMMARY
[FreeTextEntry1] : Based on her the recurrent SVT despite medications,  the patient should have the ablation procedure.\par She has had both pAF and SVT. We discussed the different ablation options and felt it would be better to ablate the SVT and then see if she has future AF. Because of her bleeding risk we will avoid the left side ablation and consider if she has recurrent AF after the SVT ablation.\par Her risk factors include history of hypertension and borderline diabetes.  She is also had tremors.  The patient has been on propafenone  mg twice a day.  She was started on this medication by Dr. Jesus Crespo at [place].  She has been on warfarin and has not had any bleeding issues.  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  \par Requested Hematology recommendations/clearance.\par Will hold propafanone/propranolol 48 hours prior to procedure.

## 2022-03-24 NOTE — REASON FOR VISIT
[Arrhythmia/ECG Abnorrmalities] : arrhythmia/ECG abnormalities [Hypertension] : hypertension [Follow-Up - Clinic] : a clinic follow-up of [Coronary Artery Disease] : coronary artery disease [FreeTextEntry3] : Almaz [FreeTextEntry1] : I saw this 71yr. old year female in f/u on 03/24/22.\par  history of PAF, HLD, Factor XI deficiency, non-obstructive CAD, prior history of breast cancer presents for follow up. She has no chest pain, SOB, abnormal bleeding. she developed palpitations described as racing and fluttering. She states her HR was sustained in the 130s for about 90 minutes. No dizziness or lightheadedness. She went to see her electrophysiologist,\par Currently on high-dose metoprolol and Rythmol, she has remained in sinus rhythm, bradycardic, with no further episodes of tachycardia.\par She has developed an intention tremor which has not responded to any form of treatment\par Previous cardiac cath showed nonobstructive coronary disease\par She will be scheduled for an ablation\par

## 2022-03-24 NOTE — REVIEW OF SYSTEMS
[Feeling Fatigued] : feeling fatigued [SOB] : shortness of breath [Palpitations] : palpitations [Dizziness] : dizziness [Tremor] : a tremor was seen [Depression] : depression [Anxiety] : anxiety [Under Stress] : under stress [Easy Bleeding] : a tendency for easy bleeding [Fever] : no fever [Weight Gain (___ Lbs)] : no recent weight gain [Sore Throat] : no sore throat [Chest Discomfort] : no chest discomfort [Syncope] : no syncope [Cough] : no cough [Abdominal Pain] : no abdominal pain [Rash] : no rash

## 2022-03-31 PROCEDURE — 93248 EXT ECG>7D<15D REV&INTERPJ: CPT

## 2022-04-05 DIAGNOSIS — I47.2 VENTRICULAR TACHYCARDIA: ICD-10-CM

## 2022-04-05 DIAGNOSIS — R00.2 PALPITATIONS: ICD-10-CM

## 2022-04-05 DIAGNOSIS — I48.91 UNSPECIFIED ATRIAL FIBRILLATION: ICD-10-CM

## 2022-04-20 ENCOUNTER — OUTPATIENT (OUTPATIENT)
Dept: OUTPATIENT SERVICES | Facility: HOSPITAL | Age: 72
LOS: 1 days | End: 2022-04-20

## 2022-04-21 ENCOUNTER — NON-APPOINTMENT (OUTPATIENT)
Age: 72
End: 2022-04-21

## 2022-04-25 ENCOUNTER — INPATIENT (INPATIENT)
Facility: HOSPITAL | Age: 72
LOS: 1 days | Discharge: HOME | End: 2022-04-27
Attending: FAMILY MEDICINE | Admitting: STUDENT IN AN ORGANIZED HEALTH CARE EDUCATION/TRAINING PROGRAM
Payer: MEDICARE

## 2022-04-25 ENCOUNTER — OUTPATIENT (OUTPATIENT)
Dept: OUTPATIENT SERVICES | Facility: HOSPITAL | Age: 72
LOS: 1 days | End: 2022-04-25

## 2022-04-25 ENCOUNTER — NON-APPOINTMENT (OUTPATIENT)
Age: 72
End: 2022-04-25

## 2022-04-25 PROCEDURE — 99285 EMERGENCY DEPT VISIT HI MDM: CPT

## 2022-04-25 PROCEDURE — 93010 ELECTROCARDIOGRAM REPORT: CPT

## 2022-04-25 PROCEDURE — 71045 X-RAY EXAM CHEST 1 VIEW: CPT | Mod: 26

## 2022-04-26 ENCOUNTER — OUTPATIENT (OUTPATIENT)
Dept: OUTPATIENT SERVICES | Facility: HOSPITAL | Age: 72
LOS: 1 days | End: 2022-04-26

## 2022-04-26 DIAGNOSIS — I47.1 SUPRAVENTRICULAR TACHYCARDIA: ICD-10-CM

## 2022-04-26 DIAGNOSIS — D68.1 HEREDITARY FACTOR XI DEFICIENCY: ICD-10-CM

## 2022-04-26 DIAGNOSIS — I48.0 PAROXYSMAL ATRIAL FIBRILLATION: ICD-10-CM

## 2022-04-26 DIAGNOSIS — Z01.812 ENCOUNTER FOR PREPROCEDURAL LABORATORY EXAMINATION: ICD-10-CM

## 2022-04-26 DIAGNOSIS — I10 ESSENTIAL (PRIMARY) HYPERTENSION: ICD-10-CM

## 2022-04-26 DIAGNOSIS — E03.9 HYPOTHYROIDISM, UNSPECIFIED: ICD-10-CM

## 2022-04-26 PROCEDURE — 93653 COMPRE EP EVAL TX SVT: CPT

## 2022-04-26 PROCEDURE — 93010 ELECTROCARDIOGRAM REPORT: CPT

## 2022-04-26 NOTE — CARDIOLOGY SUMMARY
[de-identified] : Sinus  Bradycardia  -First degree A-V block \par Nick = 220\par Low voltage in precordial leads. \par

## 2022-04-26 NOTE — HISTORY OF PRESENT ILLNESS
[FreeTextEntry1] : Patient is a 71-year-old woman who is seen in evaluation because of symptoms with the palpitations.  She gets about 3-5 episodes per day with no clear triggers and it lasts a few minutes to an hour.\par \par On February 9 she caught an episode.  She was wearing the cardia mobile device and recorded episodes at a rate of 150 bpm the P waves were not very distinct but appeared to be at end of the QRS and a distorted QRS as well suggesting some form of SVT.  The QRS complex was narrow.\par \par She has a past history of paroxysmal atrial fibrillation, dyslipidemia, factor XI deficiency, nonobstructive coronary arteries and a prior history of breast CA.\par Prior history of hypertension and borderline diabetes.\par She has a prior history of tremors and has been on propanolol.  The patient also has been on Rythmol metoprolol and anticoagulated with warfarin.\par \par Previous cardiac catheterization had showed nonobstructive coronary arteries in 2008.\par \par The report from her cardiac catheterization dated 12/18/2008 from [facility] in [place] was reviewed.  It indicated that she had atrial fibrillation with significant ST depression and borderline troponins.  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  She was given FFP prior to the procedure.  The findings on the cardiac catheterization showed that she had normal coronary arteries.  She was on carvedilol and the dose was increased to 25 mg twice daily.  When she was in rapid tachycardia she was given adenosine and it was reported after second dose of adenosine the arrhythmia had slowed and it was noted that she had underlying A. fib.  History of hypothyroidism on Synthroid was noted.  Carotid ultrasound was done then and showed less than 50% ICA stenosis bilaterally.  She had a prior history of right neck bruit.  Previous echo from 2008 that showed preserved left ventricular function and no significant valvular disease.\par \par Review of her previous test\par \par Echocardiogram done 10/9/2018 EF 60%, left atrial diameter 2.9 cm with mild minimal mitral regurgitation, left atrial volume index 24 cc/m², mild diastolic dysfunction, trace pericardial effusion, borderline pulmonary hypertension.\par \par She had a nuclear stress test performed 2/7/2022 regadenoson stress tests showing normal myocardial perfusion scan\par \par The patient had a monitor performed for 28 days starting 12/5/2018 21/6/19 reported intermittent paroxysmal atrial fibrillation.  I did not see the atrial fibrillation upon review and it appeared more of an atrial tachycardia with a slower ventricular response.\par \par She did not have any repeat the monitor since then.  The only other monitor reviewed was the episode that she showed me in February which I reviewed and thought it was regular and perhaps an atrial tachycardia as well.\par \par We reviewed the frequency of her symptoms 3-5 episodes per day lasting a few minutes to hours.  No associated triggers.  She does get shortness of breath with episodes and sometimes dizzy/lightheaded and not comfortable during episodes.\par \par \par Noted she had a prior history of breast CA status post mastectomy and chemotherapy in 1989.

## 2022-04-26 NOTE — PHYSICAL EXAM
[No Acute Distress] : no acute distress [Normal Venous Pressure] : normal venous pressure [Normal S1, S2] : normal S1, S2 [No Murmur] : no murmur [Clear Lung Fields] : clear lung fields [Normal Gait] : normal gait [No Rash] : no rash [No Edema] : no edema [No Focal Deficits] : no focal deficits [Alert and Oriented] : alert and oriented [de-identified] : Conjunctival hemorrhage

## 2022-04-26 NOTE — DISCUSSION/SUMMARY
[FreeTextEntry1] : The patient has had recurrent episodes of tachycardia/palpitations that seems to be increasing frequency and duration and level of uncomfortableness with dizziness and shortness of breath mainly.  Previous evaluation has showed that she has normal coronary arteries and preserved left ventricular function.  She does not have any significant valvular disease on previous echo.\par \par Her risk factors include history of hypertension and borderline diabetes.  She is also had tremors.  The patient has been on propafenone  mg twice a day.  She was started on this medication by Dr. Jesus Crespo at [place].  She has been on warfarin and has not had any bleeding issues.  The patient has had a prior history of ocular migraines.\par \par Ideally the patient would benefit from an ablation procedure but given her history of factor XI deficiency hemophilia is see she could be at high risk for bleeding.  The procedure could be performed using FFP and further guidance from her hematologist.  This would be a good option.  The other option would be to improve upon her medications.  She does not have coronary disease and we can consider different antiarrhythmi.  Even though propafenone and flecainide are similar drugs type Ic, she might get some better efficacy with flecainide.  We could also consider amiodarone although the side effect profile may not be suitable for the patient.\par \par The plans are we will get a follow-up monitor to further understand the nature of the arrhythmia and then I would consider switching her to flecainide.  The patient might be amenable to consideration for ablation as well.\par \par I have discussed the treatment options with the patient and she would like to proceed with a follow-up monitor and then will decide on flecainide versus an ablation procedure.  We will have a further discussion after results of the monitor is back.  In the meantime she will continue current medications.

## 2022-04-27 ENCOUNTER — OUTPATIENT (OUTPATIENT)
Dept: OUTPATIENT SERVICES | Facility: HOSPITAL | Age: 72
LOS: 1 days | End: 2022-04-27

## 2022-04-27 PROCEDURE — 93010 ELECTROCARDIOGRAM REPORT: CPT

## 2022-04-28 DIAGNOSIS — I48.0 PAROXYSMAL ATRIAL FIBRILLATION: ICD-10-CM

## 2022-04-28 DIAGNOSIS — F32.A DEPRESSION, UNSPECIFIED: ICD-10-CM

## 2022-04-28 DIAGNOSIS — Z79.01 LONG TERM (CURRENT) USE OF ANTICOAGULANTS: ICD-10-CM

## 2022-04-28 DIAGNOSIS — Z88.2 ALLERGY STATUS TO SULFONAMIDES: ICD-10-CM

## 2022-04-28 DIAGNOSIS — D68.1 HEREDITARY FACTOR XI DEFICIENCY: ICD-10-CM

## 2022-04-28 DIAGNOSIS — Z88.8 ALLERGY STATUS TO OTHER DRUGS, MEDICAMENTS AND BIOLOGICAL SUBSTANCES: ICD-10-CM

## 2022-04-28 DIAGNOSIS — I47.1 SUPRAVENTRICULAR TACHYCARDIA: ICD-10-CM

## 2022-04-28 DIAGNOSIS — I10 ESSENTIAL (PRIMARY) HYPERTENSION: ICD-10-CM

## 2022-04-28 DIAGNOSIS — E78.5 HYPERLIPIDEMIA, UNSPECIFIED: ICD-10-CM

## 2022-04-28 DIAGNOSIS — I37.1 NONRHEUMATIC PULMONARY VALVE INSUFFICIENCY: ICD-10-CM

## 2022-04-28 DIAGNOSIS — I31.3 PERICARDIAL EFFUSION (NONINFLAMMATORY): ICD-10-CM

## 2022-04-28 DIAGNOSIS — Z91.09 OTHER ALLERGY STATUS, OTHER THAN TO DRUGS AND BIOLOGICAL SUBSTANCES: ICD-10-CM

## 2022-04-28 DIAGNOSIS — R07.9 CHEST PAIN, UNSPECIFIED: ICD-10-CM

## 2022-04-28 DIAGNOSIS — I35.1 NONRHEUMATIC AORTIC (VALVE) INSUFFICIENCY: ICD-10-CM

## 2022-04-28 DIAGNOSIS — I25.10 ATHEROSCLEROTIC HEART DISEASE OF NATIVE CORONARY ARTERY WITHOUT ANGINA PECTORIS: ICD-10-CM

## 2022-04-28 DIAGNOSIS — Z82.49 FAMILY HISTORY OF ISCHEMIC HEART DISEASE AND OTHER DISEASES OF THE CIRCULATORY SYSTEM: ICD-10-CM

## 2022-04-28 DIAGNOSIS — Z88.6 ALLERGY STATUS TO ANALGESIC AGENT: ICD-10-CM

## 2022-04-28 DIAGNOSIS — F41.9 ANXIETY DISORDER, UNSPECIFIED: ICD-10-CM

## 2022-04-28 DIAGNOSIS — E03.9 HYPOTHYROIDISM, UNSPECIFIED: ICD-10-CM

## 2022-05-04 ENCOUNTER — APPOINTMENT (OUTPATIENT)
Dept: ELECTROPHYSIOLOGY | Facility: CLINIC | Age: 72
End: 2022-05-04
Payer: MEDICARE

## 2022-05-04 ENCOUNTER — NON-APPOINTMENT (OUTPATIENT)
Age: 72
End: 2022-05-04

## 2022-05-04 VITALS
OXYGEN SATURATION: 97 % | BODY MASS INDEX: 23.56 KG/M2 | TEMPERATURE: 97.1 F | DIASTOLIC BLOOD PRESSURE: 68 MMHG | HEIGHT: 64 IN | HEART RATE: 56 BPM | SYSTOLIC BLOOD PRESSURE: 120 MMHG | WEIGHT: 138 LBS

## 2022-05-04 PROCEDURE — 93000 ELECTROCARDIOGRAM COMPLETE: CPT

## 2022-05-04 PROCEDURE — 99214 OFFICE O/P EST MOD 30 MIN: CPT

## 2022-05-04 RX ORDER — METOPROLOL SUCCINATE 200 MG/1
200 TABLET, EXTENDED RELEASE ORAL
Qty: 90 | Refills: 3 | Status: DISCONTINUED | COMMUNITY
End: 2022-05-04

## 2022-05-04 RX ORDER — PROPAFENONE 425 MG/1
425 CAPSULE, EXTENDED RELEASE ORAL
Qty: 60 | Refills: 0 | Status: DISCONTINUED | COMMUNITY
Start: 2019-01-30 | End: 2022-05-04

## 2022-05-04 RX ORDER — PRIMIDONE 250 MG/1
250 TABLET ORAL
Qty: 1 | Refills: 0 | Status: DISCONTINUED | COMMUNITY
End: 2022-05-04

## 2022-05-06 DIAGNOSIS — I47.1 SUPRAVENTRICULAR TACHYCARDIA: ICD-10-CM

## 2022-05-06 DIAGNOSIS — E03.9 HYPOTHYROIDISM, UNSPECIFIED: ICD-10-CM

## 2022-05-06 DIAGNOSIS — R06.00 DYSPNEA, UNSPECIFIED: ICD-10-CM

## 2022-05-06 DIAGNOSIS — I48.91 UNSPECIFIED ATRIAL FIBRILLATION: ICD-10-CM

## 2022-05-06 DIAGNOSIS — R00.2 PALPITATIONS: ICD-10-CM

## 2022-05-06 DIAGNOSIS — E78.5 HYPERLIPIDEMIA, UNSPECIFIED: ICD-10-CM

## 2022-05-10 DIAGNOSIS — E03.9 HYPOTHYROIDISM, UNSPECIFIED: ICD-10-CM

## 2022-05-10 DIAGNOSIS — I48.91 UNSPECIFIED ATRIAL FIBRILLATION: ICD-10-CM

## 2022-05-10 DIAGNOSIS — R00.2 PALPITATIONS: ICD-10-CM

## 2022-05-10 DIAGNOSIS — I47.1 SUPRAVENTRICULAR TACHYCARDIA: ICD-10-CM

## 2022-05-10 DIAGNOSIS — R06.00 DYSPNEA, UNSPECIFIED: ICD-10-CM

## 2022-05-10 DIAGNOSIS — E78.5 HYPERLIPIDEMIA, UNSPECIFIED: ICD-10-CM

## 2022-05-19 NOTE — PHYSICAL EXAM
[No Acute Distress] : no acute distress [Normal Venous Pressure] : normal venous pressure [Normal S1, S2] : normal S1, S2 [No Murmur] : no murmur [Clear Lung Fields] : clear lung fields [Normal Gait] : normal gait [No Edema] : no edema [No Rash] : no rash [No Focal Deficits] : no focal deficits [Alert and Oriented] : alert and oriented [de-identified] : Conjunctival hemorrhage

## 2022-05-19 NOTE — DISCUSSION/SUMMARY
[FreeTextEntry1] : Patient has an atrial tachycardia that was partially ablated.  It was mapped close to the AV node.  She was reluctant to have a pacemaker.  She is now doing well on flecainide as well as beta-blocker.  We will continue this regiment and see how she does over time.  Should she have recurrent SVT we might reconsider catheter ablation but with knowledge that he is at very high risk of heart block requiring a pacemaker.\par \par Recommend follow-up monitoring continue current medications and anticoagulation.

## 2022-05-19 NOTE — HISTORY OF PRESENT ILLNESS
[FreeTextEntry1] : Patient is a 71-year-old woman who is seen in follow-up evaluation status post recent electrophysiology study and catheter ablation.  She had an atrial tachycardia that was mapped close to the AV node region.  It was partially ablated.  She was at risk for heart block and cryoablation was used.  We ablated extremely close to note still had residual nonsustained atrial tachycardia.  Ablation in the close that would have probably resulted in heart block and was not done.  Because of the residual tachycardia she was sent home on flecainide.  In follow-up the patient is doing well without any recurrence of tachycardia.\par \par Previous history.:  ER presentation to [facility] yesterday 3/15/2022.  She had presented with a tachycardia SVT short RP about 180 bpm.  Patient was on high-dose propafenone as well as metoprolol.   \par \par She has a history of factor XI deficiency.  \par \par On February 9 she caught an episode.  She was wearing the [employer] mobile device and recorded episodes at a rate of 150 bpm the P waves were not very distinct but appeared to be at end of the QRS and a distorted QRS as well suggesting some form of SVT.  The QRS complex was narrow.\par \par She has a past history of paroxysmal atrial fibrillation, dyslipidemia, factor XI deficiency, nonobstructive coronary arteries and a prior history of breast CA.\par Prior history of hypertension and borderline diabetes.\par She has a prior history of tremors and has been on propanolol.  The patient also has been on Rythmol metoprolol and anticoagulated with warfarin.\par \par Previous cardiac catheterization had showed nonobstructive coronary arteries in 2008.\par \par The report from her cardiac catheterization dated 12/18/2008 from [facility] in [place] was reviewed.  It indicated that she had atrial fibrillation with significant ST depression and borderline troponins.  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  She was given FFP prior to the procedure.  The findings on the cardiac catheterization showed that she had normal coronary arteries.  She was on carvedilol and the dose was increased to 25 mg twice daily.  When she was in rapid tachycardia she was given adenosine and it was reported after second dose of adenosine the arrhythmia had slowed and it was noted that she had underlying A. fib.  History of hypothyroidism on Synthroid was noted.  Carotid ultrasound was done then and showed less than 50% ICA stenosis bilaterally.  She had a prior history of right neck bruit.  Previous echo from 2008 that showed preserved left ventricular function and no significant valvular disease.\par \par Review of her previous test\par \par Echocardiogram done 10/9/2018 EF 60%, left atrial diameter 2.9 cm with mild minimal mitral regurgitation, left atrial volume index 24 cc/m², mild diastolic dysfunction, trace pericardial effusion, borderline pulmonary hypertension.\par \par She had a nuclear stress test performed 2/7/2022 regadenoson stress tests showing normal myocardial perfusion scan\par \par The patient had a monitor performed for 28 days starting 12/5/2018 21/6/19 reported intermittent paroxysmal atrial fibrillation.  I did not see the atrial fibrillation upon review and it appeared more of an atrial tachycardia with a slower ventricular response.\par \par \par \par Noted she had a prior history of breast CA status post mastectomy and chemotherapy in 1989.

## 2022-05-24 RX ORDER — METOPROLOL SUCCINATE 50 MG/1
50 TABLET, EXTENDED RELEASE ORAL
Qty: 180 | Refills: 3 | Status: DISCONTINUED | COMMUNITY
End: 2022-05-24

## 2022-06-13 ENCOUNTER — NON-APPOINTMENT (OUTPATIENT)
Age: 72
End: 2022-06-13

## 2022-06-13 ENCOUNTER — APPOINTMENT (OUTPATIENT)
Dept: OPHTHALMOLOGY | Facility: CLINIC | Age: 72
End: 2022-06-13
Payer: MEDICARE

## 2022-06-13 PROCEDURE — 92014 COMPRE OPH EXAM EST PT 1/>: CPT

## 2022-09-01 ENCOUNTER — NON-APPOINTMENT (OUTPATIENT)
Age: 72
End: 2022-09-01

## 2022-09-01 ENCOUNTER — APPOINTMENT (OUTPATIENT)
Dept: CARDIOLOGY | Facility: CLINIC | Age: 72
End: 2022-09-01

## 2022-09-01 VITALS
HEART RATE: 60 BPM | BODY MASS INDEX: 23.9 KG/M2 | HEIGHT: 64 IN | SYSTOLIC BLOOD PRESSURE: 126 MMHG | OXYGEN SATURATION: 97 % | TEMPERATURE: 96.9 F | DIASTOLIC BLOOD PRESSURE: 70 MMHG | WEIGHT: 140 LBS

## 2022-09-01 DIAGNOSIS — R25.1 TREMOR, UNSPECIFIED: ICD-10-CM

## 2022-09-01 DIAGNOSIS — Z79.899 OTHER LONG TERM (CURRENT) DRUG THERAPY: ICD-10-CM

## 2022-09-01 PROCEDURE — 99215 OFFICE O/P EST HI 40 MIN: CPT

## 2022-09-01 RX ORDER — PROPRANOLOL HYDROCHLORIDE 20 MG/1
20 TABLET ORAL
Qty: 30 | Refills: 0 | Status: DISCONTINUED | COMMUNITY
End: 2022-09-01

## 2022-09-01 NOTE — REASON FOR VISIT
[Arrhythmia/ECG Abnorrmalities] : arrhythmia/ECG abnormalities [Hypertension] : hypertension [Follow-Up - Clinic] : a clinic follow-up of [Coronary Artery Disease] : coronary artery disease [FreeTextEntry3] : Almaz [FreeTextEntry1] : I saw this 72yr. old year female in f/u on 09/01/22.\par  history of PAF, HLD, Factor XI deficiency, non-obstructive CAD, prior history of breast cancer presents for follow up. She has no chest pain, SOB, abnormal bleeding. she developed palpitations described as racing and fluttering. She states her HR was sustained in the 130s for about 90 minutes. No dizziness or lightheadedness. 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

## 2022-09-01 NOTE — HISTORY OF PRESENT ILLNESS
[FreeTextEntry1] : She has no chest pain\par She has no shortness of breath\par She has no palpitations\par She has no syncope\par She is neurologically intact but has a tremor\par She has no edema\par She has no skin rashes\par

## 2022-09-01 NOTE — DISCUSSION/SUMMARY
[FreeTextEntry1] : 1. non-obstructive CAD: discussed findings of her CT of the heart for CAC score. Total calcium score of 28 Agatston units and 10 year ASCVD Risk score of 9.72%. Tolerating atorvastatin 10mg daily.  LDL 75 on labs from 12/3/2019 (while she was on 20mg daily, dose was decreased because patient thought it was causing her hair loss). Continue statin therapy at 10mg daily. Will recheck lipid panel. If LDL climbing she is agreeable to increase dose back to 20mg daily. \par \par 2. PAF: continue Rythmol 325mg BID (high risk medication with no signs of toxicity), Toprol XL 250mg daily and Warfarin ( high risk medication with no signs of toxicity;Goal INR should be 2-3). No signs of ischemia or prior infarction on pharmacologic nuclear stress test performed March 2019. Normal LV systolic function on echocardiogram from October 2018. INR being followed by hematologist. Follow up with EP regarding results of event monitor. \par \par 3. HTN: controlled on Toprol XL 50mg daily \par \par Follow up in 6 months.

## 2022-09-29 ENCOUNTER — NON-APPOINTMENT (OUTPATIENT)
Age: 72
End: 2022-09-29

## 2022-09-30 RX ORDER — TRIAMTERENE AND HYDROCHLOROTHIAZIDE 37.5; 25 MG/1; MG/1
37.5-25 CAPSULE ORAL DAILY
Qty: 90 | Refills: 1 | Status: DISCONTINUED | COMMUNITY
Start: 2021-02-03 | End: 2022-09-30

## 2022-09-30 RX ORDER — SPIRONOLACTONE 50 MG/1
50 TABLET, FILM COATED ORAL
Refills: 0 | Status: ACTIVE | COMMUNITY

## 2022-10-05 RX ORDER — SIMVASTATIN 40 MG/1
40 TABLET, FILM COATED ORAL
Refills: 0 | Status: DISCONTINUED | COMMUNITY
End: 2022-10-05

## 2022-10-05 RX ORDER — ATORVASTATIN CALCIUM 20 MG/1
20 TABLET, FILM COATED ORAL
Qty: 30 | Refills: 0 | Status: ACTIVE | COMMUNITY
Start: 2022-10-05

## 2022-10-12 ENCOUNTER — NON-APPOINTMENT (OUTPATIENT)
Age: 72
End: 2022-10-12

## 2022-10-12 ENCOUNTER — APPOINTMENT (OUTPATIENT)
Dept: ELECTROPHYSIOLOGY | Facility: CLINIC | Age: 72
End: 2022-10-12

## 2022-10-12 VITALS
WEIGHT: 140 LBS | SYSTOLIC BLOOD PRESSURE: 138 MMHG | HEART RATE: 51 BPM | HEIGHT: 64 IN | DIASTOLIC BLOOD PRESSURE: 78 MMHG | BODY MASS INDEX: 23.9 KG/M2 | OXYGEN SATURATION: 98 % | TEMPERATURE: 97.1 F

## 2022-10-12 PROCEDURE — 99214 OFFICE O/P EST MOD 30 MIN: CPT

## 2022-10-12 PROCEDURE — 93000 ELECTROCARDIOGRAM COMPLETE: CPT

## 2022-10-12 RX ORDER — PROPRANOLOL HYDROCHLORIDE 40 MG/1
40 TABLET ORAL DAILY
Refills: 0 | Status: DISCONTINUED | COMMUNITY
End: 2022-10-12

## 2022-10-12 RX ORDER — METOPROLOL TARTRATE 50 MG/1
50 TABLET, FILM COATED ORAL DAILY
Refills: 0 | Status: DISCONTINUED | COMMUNITY
End: 2022-10-12

## 2022-10-12 RX ORDER — POTASSIUM CHLORIDE 750 MG/1
10 TABLET, FILM COATED, EXTENDED RELEASE ORAL
Qty: 90 | Refills: 3 | Status: DISCONTINUED | COMMUNITY
Start: 2020-09-23 | End: 2022-10-12

## 2022-10-12 NOTE — PHYSICAL EXAM
[No Acute Distress] : no acute distress [Normal Venous Pressure] : normal venous pressure [Normal S1, S2] : normal S1, S2 [No Murmur] : no murmur [Clear Lung Fields] : clear lung fields [Normal Gait] : normal gait [No Edema] : no edema [No Rash] : no rash [No Focal Deficits] : no focal deficits [Alert and Oriented] : alert and oriented [de-identified] : Conjunctival hemorrhage

## 2022-10-12 NOTE — CARDIOLOGY SUMMARY
[de-identified] : Sinus  Bradycardia  -First degree A-V block \par Nick = 244\par -Anterior infarct -age undetermined. \par \par Abnormal T wave changes

## 2022-10-12 NOTE — HISTORY OF PRESENT ILLNESS
[FreeTextEntry1] : Follow-up from recent hospitalization for SVT.  Patient was seen in the emergency room with an SVT self terminated.  She has had palpitations.  Patient has been on flecainide 150 mg twice daily.  She isalso on metoprolol 25 mg twice daily.  \par Previous EP study done April 2022:  she had an atrial tachycardia that was mapped close to the AV node region.  It was partially ablated.  She was at risk for heart block and cryoablation was used.  We ablated extremely close to note still had residual nonsustained atrial tachycardia.  Ablation in the close that would have probably resulted in heart block and was not done.  Because of the residual tachycardia she was sent home on flecainide.  \par \par Previous history.:  ER presentation to [facility] yesterday 3/15/2022.  She had presented with a tachycardia SVT short RP about 180 bpm.  Patient was on high-dose propafenone as well as metoprolol.   \par \par She has a history of factor XI deficiency.  \par \par On February 9 she caught an episode.  She was wearing the [employer] mobile device and recorded episodes at a rate of 150 bpm the P waves were not very distinct but appeared to be at end of the QRS and a distorted QRS as well suggesting some form of SVT.  The QRS complex was narrow.\par \par She has a past history of paroxysmal atrial fibrillation, dyslipidemia, factor XI deficiency, nonobstructive coronary arteries and a prior history of breast CA.\par Prior history of hypertension and borderline diabetes.\par She has a prior history of tremors and has been on propanolol.  The patient also has been on Rythmol metoprolol and anticoagulated with warfarin.\par \par Previous cardiac catheterization had showed nonobstructive coronary arteries in 2008.\par \par The report from her cardiac catheterization dated 12/18/2008 from [facility] in [place] was reviewed.  It indicated that she had atrial fibrillation with significant ST depression and borderline troponins.  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  She was given FFP prior to the procedure.  The findings on the cardiac catheterization showed that she had normal coronary arteries.  She was on carvedilol and the dose was increased to 25 mg twice daily.  When she was in rapid tachycardia she was given adenosine and it was reported after second dose of adenosine the arrhythmia had slowed and it was noted that she had underlying A. fib.  History of hypothyroidism on Synthroid was noted.  Carotid ultrasound was done then and showed less than 50% ICA stenosis bilaterally.  She had a prior history of right neck bruit.  Previous echo from 2008 that showed preserved left ventricular function and no significant valvular disease.\par \par Review of her previous test\par \par Echocardiogram done 10/9/2018 EF 60%, left atrial diameter 2.9 cm with mild minimal mitral regurgitation, left atrial volume index 24 cc/m², mild diastolic dysfunction, trace pericardial effusion, borderline pulmonary hypertension.\par \par She had a nuclear stress test performed 2/7/2022 regadenoson stress tests showing normal myocardial perfusion scan\par \par The patient had a monitor performed for 28 days starting 12/5/2018 21/6/19 reported intermittent paroxysmal atrial fibrillation.  I did not see the atrial fibrillation upon review and it appeared more of an atrial tachycardia with a slower ventricular response.\par \par \par \par Noted she had a prior history of breast CA status post mastectomy and chemotherapy in 1989.

## 2022-10-12 NOTE — DISCUSSION/SUMMARY
[EKG obtained to assist in diagnosis and management of assessed problem(s)] : EKG obtained to assist in diagnosis and management of assessed problem(s) [FreeTextEntry1] : The patient is having occasional episodes of atrial tachycardia.  She is currently on a good dose of flecainide 150 mg twice daily as well as metoprolol tartrate 25 mg twice daily.  She is still having small breakthrough episodes.\par \par Previous history noted for factor XI deficiency.  She was previously on propafenone and metoprolol and also still had episodes on this regimen.\par \par We could increase the flecainide to 200 mg twice daily but I would prefer to see how she does on the current dosage before doing so.  The other option would be a consideration of another catheter ablation procedure at this time we would be fully aware of the high risk of AV block requiring a pacemaker.\par \par We rediscussed her medications and she feels fatigued presumably as result of the beta-blocker.  Our plan will be to discontinue the metoprolol and and increase her Cardizem from 120 mg daily to 180 mg daily.\par \par In addition to discharge her medications her dermatologist consultation with her cardiologist started her on spironolactone 50 mg/day.  This is to decrease the facial hair that she has been getting as a result of using Rogaine.  I am concerned about the combination of medications in terms of her blood pressure and we may need to decrease the spironolactone possibly 25 mg a day should her blood pressure decrease.\par \par Followup in  3 months.

## 2022-10-12 NOTE — REVIEW OF SYSTEMS
[Feeling Fatigued] : feeling fatigued [Palpitations] : palpitations [Tremor] : a tremor was seen [Depression] : depression [Anxiety] : anxiety [Under Stress] : under stress [Easy Bleeding] : a tendency for easy bleeding [Fever] : no fever [Weight Gain (___ Lbs)] : no recent weight gain [Sore Throat] : no sore throat [SOB] : no shortness of breath [Chest Discomfort] : no chest discomfort [Syncope] : no syncope [Cough] : no cough [Abdominal Pain] : no abdominal pain [Rash] : no rash [Dizziness] : no dizziness

## 2022-10-14 ENCOUNTER — NON-APPOINTMENT (OUTPATIENT)
Age: 72
End: 2022-10-14

## 2022-10-24 ENCOUNTER — APPOINTMENT (OUTPATIENT)
Dept: ELECTROPHYSIOLOGY | Facility: CLINIC | Age: 72
End: 2022-10-24

## 2022-10-24 ENCOUNTER — APPOINTMENT (OUTPATIENT)
Dept: CARDIOLOGY | Facility: CLINIC | Age: 72
End: 2022-10-24

## 2022-10-24 ENCOUNTER — NON-APPOINTMENT (OUTPATIENT)
Age: 72
End: 2022-10-24

## 2022-10-24 VITALS
BODY MASS INDEX: 23.9 KG/M2 | HEART RATE: 75 BPM | HEIGHT: 64 IN | OXYGEN SATURATION: 98 % | SYSTOLIC BLOOD PRESSURE: 142 MMHG | WEIGHT: 140 LBS | TEMPERATURE: 97.1 F | DIASTOLIC BLOOD PRESSURE: 80 MMHG

## 2022-10-24 PROCEDURE — 93000 ELECTROCARDIOGRAM COMPLETE: CPT

## 2022-10-24 PROCEDURE — 99214 OFFICE O/P EST MOD 30 MIN: CPT

## 2022-10-24 NOTE — CARDIOLOGY SUMMARY
[de-identified] : Sinus  Rhythm  -First degree A-V block \par Nick = 234\par -Left atrial enlargement. \par -Anteroseptal infarct -age undetermined

## 2022-10-24 NOTE — HISTORY OF PRESENT ILLNESS
[FreeTextEntry1] : Patient seen in follow-up today because she had another bout of an SVT last week.  She took an extra dose of Cardizem and it had stopped afterwards.  Patient is currently on flecainide 150 mg twice daily and diltiazem 180 mg/day.    She is not on metoprolol.  The patient was previously on Inderal for tremor and this was subsequently discontinued by her neurologist because the opinion was was not resulted in improvement of her tremor.\par \par Trigger to her SVT episodes: No clear trigger.  There does not seem to be any unusual stress or anxiety that initiates her episodes.\par \par Previous EP study done April 2022:  she had an atrial tachycardia that was mapped close to the AV node region.  It was partially ablated.  She was at risk for heart block and cryoablation was used.  We ablated extremely close to note still had residual nonsustained atrial tachycardia.  Ablation in the close that would have probably resulted in heart block and was not done.  Because of the residual tachycardia she was sent home on flecainide.  \par \par Previous history.:  ER presentation to [facility] yesterday 3/15/2022.  She had presented with a tachycardia SVT short RP about 180 bpm.  Patient was on high-dose propafenone as well as metoprolol.   \par \par She has a history of factor XI deficiency.  \par \par On February 9 she caught an episode.  She was wearing the [employer]ia mobile device and recorded episodes at a rate of 150 bpm the P waves were not very distinct but appeared to be at end of the QRS and a distorted QRS as well suggesting some form of SVT.  The QRS complex was narrow.\par \par She has a past history of paroxysmal atrial fibrillation, dyslipidemia, factor XI deficiency, nonobstructive coronary arteries and a prior history of breast CA.\par Prior history of hypertension and borderline diabetes.\par She has a prior history of tremors and has been on propanolol.  The patient also has been on Rythmol metoprolol and anticoagulated with warfarin.\par \par Previous cardiac catheterization had showed nonobstructive coronary arteries in 2008.\par \par The report from her cardiac catheterization dated 12/18/2008 from [facility] in [place] was reviewed.  It indicated that she had atrial fibrillation with significant ST depression and borderline troponins.  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  She was given FFP prior to the procedure.  The findings on the cardiac catheterization showed that she had normal coronary arteries.  She was on carvedilol and the dose was increased to 25 mg twice daily.  When she was in rapid tachycardia she was given adenosine and it was reported after second dose of adenosine the arrhythmia had slowed and it was noted that she had underlying A. fib.  History of hypothyroidism on Synthroid was noted.  Carotid ultrasound was done then and showed less than 50% ICA stenosis bilaterally.  She had a prior history of right neck bruit.  Previous echo from 2008 that showed preserved left ventricular function and no significant valvular disease.\par \par Review of her previous test\par \par Echocardiogram done 10/9/2018 EF 60%, left atrial diameter 2.9 cm with mild minimal mitral regurgitation, left atrial volume index 24 cc/m², mild diastolic dysfunction, trace pericardial effusion, borderline pulmonary hypertension.\par \par She had a nuclear stress test performed 2/7/2022 regadenoson stress tests showing normal myocardial perfusion scan\par \par The patient had a monitor performed for 28 days starting 12/5/2018 21/6/19 reported intermittent paroxysmal atrial fibrillation.  I did not see the atrial fibrillation upon review and it appeared more of an atrial tachycardia with a slower ventricular response.\par \par \par \par Noted she had a prior history of breast CA status post mastectomy and chemotherapy in 1989.

## 2022-10-24 NOTE — PHYSICAL EXAM
[No Acute Distress] : no acute distress [Normal Venous Pressure] : normal venous pressure [Normal S1, S2] : normal S1, S2 [No Murmur] : no murmur [Clear Lung Fields] : clear lung fields [Normal Gait] : normal gait [No Edema] : no edema [No Rash] : no rash [No Focal Deficits] : no focal deficits [Alert and Oriented] : alert and oriented [de-identified] : Conjunctival hemorrhage

## 2022-10-24 NOTE — DISCUSSION/SUMMARY
[EKG obtained to assist in diagnosis and management of assessed problem(s)] : EKG obtained to assist in diagnosis and management of assessed problem(s) [FreeTextEntry1] : The patient has had recurrent episode of supraventricular tachycardia.  Her episodes seem to occur after she came off the beta-blocker therapy.  We discussed the various treatment options including medications versus another ablation.  I explained to the patient she has noted patient is high risk that she might need a pacemaker.  Also the concern of this patient for the ablation procedures as a factor deficiency and bleeding risk.  We may also have to map the left side given the region at the tachycardias originating from.  Patient would prefer not to have additional procedures or run the risk of a pacemaker.  She would prefer to try medical therapy.\par \par I would recommend that we restart her metoprolol 50 mg twice daily and see how she does.  The other option would be for us to increase the dose of flecainide from 150-200 twice daily.  We will try these steps before considering another ablation procedure.\par \par The patient understands the management and again would prefer to try medications.  Also she will pay attention to the amount of caffeine intake seen by the reduction could be helpful.  \par Followup in  4  months.

## 2022-12-02 ENCOUNTER — NON-APPOINTMENT (OUTPATIENT)
Age: 72
End: 2022-12-02

## 2022-12-12 ENCOUNTER — NON-APPOINTMENT (OUTPATIENT)
Age: 72
End: 2022-12-12

## 2022-12-12 ENCOUNTER — APPOINTMENT (OUTPATIENT)
Dept: OPHTHALMOLOGY | Facility: CLINIC | Age: 72
End: 2022-12-12

## 2022-12-12 PROCEDURE — 99214 OFFICE O/P EST MOD 30 MIN: CPT

## 2023-01-23 ENCOUNTER — NON-APPOINTMENT (OUTPATIENT)
Age: 73
End: 2023-01-23

## 2023-01-23 ENCOUNTER — APPOINTMENT (OUTPATIENT)
Dept: ELECTROPHYSIOLOGY | Facility: CLINIC | Age: 73
End: 2023-01-23
Payer: MEDICARE

## 2023-01-23 VITALS
OXYGEN SATURATION: 98 % | DIASTOLIC BLOOD PRESSURE: 64 MMHG | HEIGHT: 64 IN | TEMPERATURE: 97.5 F | WEIGHT: 140 LBS | BODY MASS INDEX: 23.9 KG/M2 | SYSTOLIC BLOOD PRESSURE: 108 MMHG | HEART RATE: 53 BPM

## 2023-01-23 DIAGNOSIS — J45.909 UNSPECIFIED ASTHMA, UNCOMPLICATED: ICD-10-CM

## 2023-01-23 PROCEDURE — 99214 OFFICE O/P EST MOD 30 MIN: CPT

## 2023-01-23 PROCEDURE — 93000 ELECTROCARDIOGRAM COMPLETE: CPT

## 2023-02-05 NOTE — CARDIOLOGY SUMMARY
[de-identified] : Sinus  Bradycardia  -First degree A-V block \par Nick = 258\par -Anteroseptal infarct -age undetermined.

## 2023-02-05 NOTE — REVIEW OF SYSTEMS
[Tremor] : a tremor was seen [Depression] : depression [Anxiety] : anxiety [Under Stress] : under stress [Easy Bleeding] : a tendency for easy bleeding [Syncope] : no syncope [Fever] : no fever [Weight Gain (___ Lbs)] : no recent weight gain [Feeling Fatigued] : not feeling fatigued [Sore Throat] : no sore throat [SOB] : no shortness of breath [Chest Discomfort] : no chest discomfort [Palpitations] : no palpitations [Cough] : no cough [Abdominal Pain] : no abdominal pain [Rash] : no rash [Dizziness] : no dizziness

## 2023-02-05 NOTE — PHYSICAL EXAM
[No Acute Distress] : no acute distress [Normal Venous Pressure] : normal venous pressure [Normal S1, S2] : normal S1, S2 [No Murmur] : no murmur [Clear Lung Fields] : clear lung fields [Normal Gait] : normal gait [No Edema] : no edema [No Rash] : no rash [No Focal Deficits] : no focal deficits [Alert and Oriented] : alert and oriented [de-identified] : Conjunctival hemorrhage

## 2023-02-05 NOTE — HISTORY OF PRESENT ILLNESS
[FreeTextEntry1] : She is seen in follow-up evaluation for prior SVT.  Patient is overall been doing well since last seen.  She had 1 short bout of SVT at a rate of 130 bpm lasting 5 to 10 minutes.  She had no associated symptoms and no trigger.  Patient's been compliant her medications.  Overall she is feeling well denies any chest pain shortness of breath dizziness, lightness, syncope or presyncope.  She is tolerating her medications well.\par \par She is currently on flecainide 150 mg twice daily as well as metoprolol succinate ER 50 mg daily in addition to her other medications.\par \par Previous EP study done April 2022:  she had an atrial tachycardia that was mapped close to the AV node region.  It was partially ablated.  She was at risk for heart block and cryoablation was used.  We ablated extremely close to note still had residual nonsustained atrial tachycardia.  Ablation in the close that would have probably resulted in heart block and was not done.  Because of the residual tachycardia she was sent home on flecainide.  \par \par Previous history.:  ER presentation to [facility] yesterday 3/15/2022.  She had presented with a tachycardia SVT short RP about 180 bpm.  Patient was on high-dose propafenone as well as metoprolol.   \par \par She has a history of factor XI deficiency.  \par \par On February 9 she caught an episode.  She was wearing the [employer] mobile device and recorded episodes at a rate of 150 bpm the P waves were not very distinct but appeared to be at end of the QRS and a distorted QRS as well suggesting some form of SVT.  The QRS complex was narrow.\par \par She has a past history of paroxysmal atrial fibrillation, dyslipidemia, factor XI deficiency, nonobstructive coronary arteries and a prior history of breast CA.\par Prior history of hypertension and borderline diabetes.\par She has a prior history of tremors and has been on propanolol.  The patient also has been on Rythmol metoprolol and anticoagulated with warfarin.\par \par Previous cardiac catheterization had showed nonobstructive coronary arteries in 2008.\par \par The report from her cardiac catheterization dated 12/18/2008 from [facility] in [place] was reviewed.  It indicated that she had atrial fibrillation with significant ST depression and borderline troponins.  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  She was given FFP prior to the procedure.  The findings on the cardiac catheterization showed that she had normal coronary arteries.  She was on carvedilol and the dose was increased to 25 mg twice daily.  When she was in rapid tachycardia she was given adenosine and it was reported after second dose of adenosine the arrhythmia had slowed and it was noted that she had underlying A. fib.  History of hypothyroidism on Synthroid was noted.  Carotid ultrasound was done then and showed less than 50% ICA stenosis bilaterally.  She had a prior history of right neck bruit.  Previous echo from 2008 that showed preserved left ventricular function and no significant valvular disease.\par \par Review of her previous test\par \par Echocardiogram done 10/9/2018 EF 60%, left atrial diameter 2.9 cm with mild minimal mitral regurgitation, left atrial volume index 24 cc/m², mild diastolic dysfunction, trace pericardial effusion, borderline pulmonary hypertension.\par \par She had a nuclear stress test performed 2/7/2022 regadenoson stress tests showing normal myocardial perfusion scan\par \par The patient had a monitor performed for 28 days starting 12/5/2018 21/6/19 reported intermittent paroxysmal atrial fibrillation.  I did not see the atrial fibrillation upon review and it appeared more of an atrial tachycardia with a slower ventricular response.\par \par \par \par Noted she had a prior history of breast CA status post mastectomy and chemotherapy in 1989.

## 2023-02-05 NOTE — CARDIOLOGY SUMMARY
[de-identified] : Sinus  Bradycardia  -First degree A-V block \par Nick = 258\par -Anteroseptal infarct -age undetermined.

## 2023-02-05 NOTE — DISCUSSION/SUMMARY
[EKG obtained to assist in diagnosis and management of assessed problem(s)] : EKG obtained to assist in diagnosis and management of assessed problem(s) [FreeTextEntry1] : Patient is feeling well and had 1 short episode of SVT at a slow rate of 130 bpm.\par \par Her examination today was unremarkable.  We reviewed her medications and recommend continuing the current dosages of flecainide as well as metoprolol.\par \par We rediscussed her SVT and she feels that she has been doing really well with this single episode and does not want any further ablative therapy.  We will continue to monitor and is medications.  \par \par I would recommend that we restart her metoprolol 50 mg twice daily and see how she does.  The other option would be for us to increase the dose of flecainide from 150-200 twice daily.  We will try these steps before considering another ablation procedure.\par  She has a history of factor Xi deficiency - no bleeding issues\par Followup in  6  months.

## 2023-02-05 NOTE — HISTORY OF PRESENT ILLNESS
[FreeTextEntry1] : She is seen in follow-up evaluation for prior SVT.  Patient is overall been doing well since last seen.  She had 1 short bout of SVT at a rate of 130 bpm lasting 5 to 10 minutes.  She had no associated symptoms and no trigger.  Patient's been compliant her medications.  Overall she is feeling well denies any chest pain shortness of breath dizziness, lightness, syncope or presyncope.  She is tolerating her medications well.\par \par She is currently on flecainide 150 mg twice daily as well as metoprolol succinate ER 50 mg daily in addition to her other medications.\par \par Previous EP study done April 2022:  she had an atrial tachycardia that was mapped close to the AV node region.  It was partially ablated.  She was at risk for heart block and cryoablation was used.  We ablated extremely close to note still had residual nonsustained atrial tachycardia.  Ablation in the close that would have probably resulted in heart block and was not done.  Because of the residual tachycardia she was sent home on flecainide.  \par \par Previous history.:  ER presentation to [facility] yesterday 3/15/2022.  She had presented with a tachycardia SVT short RP about 180 bpm.  Patient was on high-dose propafenone as well as metoprolol.   \par \par She has a history of factor XI deficiency.  \par \par On February 9 she caught an episode.  She was wearing the [employer] mobile device and recorded episodes at a rate of 150 bpm the P waves were not very distinct but appeared to be at end of the QRS and a distorted QRS as well suggesting some form of SVT.  The QRS complex was narrow.\par \par She has a past history of paroxysmal atrial fibrillation, dyslipidemia, factor XI deficiency, nonobstructive coronary arteries and a prior history of breast CA.\par Prior history of hypertension and borderline diabetes.\par She has a prior history of tremors and has been on propanolol.  The patient also has been on Rythmol metoprolol and anticoagulated with warfarin.\par \par Previous cardiac catheterization had showed nonobstructive coronary arteries in 2008.\par \par The report from her cardiac catheterization dated 12/18/2008 from [facility] in [place] was reviewed.  It indicated that she had atrial fibrillation with significant ST depression and borderline troponins.  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  She was given FFP prior to the procedure.  The findings on the cardiac catheterization showed that she had normal coronary arteries.  She was on carvedilol and the dose was increased to 25 mg twice daily.  When she was in rapid tachycardia she was given adenosine and it was reported after second dose of adenosine the arrhythmia had slowed and it was noted that she had underlying A. fib.  History of hypothyroidism on Synthroid was noted.  Carotid ultrasound was done then and showed less than 50% ICA stenosis bilaterally.  She had a prior history of right neck bruit.  Previous echo from 2008 that showed preserved left ventricular function and no significant valvular disease.\par \par Review of her previous test\par \par Echocardiogram done 10/9/2018 EF 60%, left atrial diameter 2.9 cm with mild minimal mitral regurgitation, left atrial volume index 24 cc/m², mild diastolic dysfunction, trace pericardial effusion, borderline pulmonary hypertension.\par \par She had a nuclear stress test performed 2/7/2022 regadenoson stress tests showing normal myocardial perfusion scan\par \par The patient had a monitor performed for 28 days starting 12/5/2018 21/6/19 reported intermittent paroxysmal atrial fibrillation.  I did not see the atrial fibrillation upon review and it appeared more of an atrial tachycardia with a slower ventricular response.\par \par \par \par Noted she had a prior history of breast CA status post mastectomy and chemotherapy in 1989.

## 2023-02-05 NOTE — PHYSICAL EXAM
[No Acute Distress] : no acute distress [Normal Venous Pressure] : normal venous pressure [Normal S1, S2] : normal S1, S2 [No Murmur] : no murmur [Clear Lung Fields] : clear lung fields [Normal Gait] : normal gait [No Edema] : no edema [No Rash] : no rash [No Focal Deficits] : no focal deficits [Alert and Oriented] : alert and oriented [de-identified] : Conjunctival hemorrhage

## 2023-03-14 ENCOUNTER — APPOINTMENT (OUTPATIENT)
Dept: CARDIOLOGY | Facility: CLINIC | Age: 73
End: 2023-03-14
Payer: MEDICARE

## 2023-03-14 VITALS
WEIGHT: 140 LBS | TEMPERATURE: 97.1 F | HEIGHT: 64 IN | DIASTOLIC BLOOD PRESSURE: 68 MMHG | SYSTOLIC BLOOD PRESSURE: 110 MMHG | OXYGEN SATURATION: 97 % | BODY MASS INDEX: 23.9 KG/M2 | HEART RATE: 57 BPM

## 2023-03-14 DIAGNOSIS — F32.A DEPRESSION, UNSPECIFIED: ICD-10-CM

## 2023-03-14 DIAGNOSIS — Z00.00 ENCOUNTER FOR GENERAL ADULT MEDICAL EXAMINATION W/OUT ABNORMAL FINDINGS: ICD-10-CM

## 2023-03-14 PROCEDURE — 99215 OFFICE O/P EST HI 40 MIN: CPT

## 2023-03-14 RX ORDER — ALBUTEROL 90 MCG
90 AEROSOL (GRAM) INHALATION
Refills: 0 | Status: ACTIVE | COMMUNITY

## 2023-03-14 NOTE — REASON FOR VISIT
[Arrhythmia/ECG Abnorrmalities] : arrhythmia/ECG abnormalities [Hypertension] : hypertension [Follow-Up - Clinic] : a clinic follow-up of [Coronary Artery Disease] : coronary artery disease [FreeTextEntry3] : Almaz [FreeTextEntry1] : I saw this 72yr. old year female in f/u on 03/14/23\par  history of PAF, HLD, Factor XI deficiency, non-obstructive CAD, prior history of breast cancer presents for follow up. She has no chest pain, SOB, abnormal bleeding. she developed palpitations described as racing and fluttering. She states her HR was sustained in the 130s for about 90 minutes. No dizziness or lightheadedness. 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

## 2023-03-23 ENCOUNTER — APPOINTMENT (OUTPATIENT)
Dept: UROGYNECOLOGY | Facility: CLINIC | Age: 73
End: 2023-03-23

## 2023-04-20 ENCOUNTER — NON-APPOINTMENT (OUTPATIENT)
Age: 73
End: 2023-04-20

## 2023-05-31 ENCOUNTER — NON-APPOINTMENT (OUTPATIENT)
Age: 73
End: 2023-05-31

## 2023-07-17 ENCOUNTER — APPOINTMENT (OUTPATIENT)
Dept: ELECTROPHYSIOLOGY | Facility: CLINIC | Age: 73
End: 2023-07-17
Payer: MEDICARE

## 2023-07-17 ENCOUNTER — NON-APPOINTMENT (OUTPATIENT)
Age: 73
End: 2023-07-17

## 2023-07-17 VITALS
SYSTOLIC BLOOD PRESSURE: 140 MMHG | HEIGHT: 64 IN | OXYGEN SATURATION: 95 % | DIASTOLIC BLOOD PRESSURE: 80 MMHG | WEIGHT: 140 LBS | HEART RATE: 55 BPM | BODY MASS INDEX: 23.9 KG/M2

## 2023-07-17 PROCEDURE — 99214 OFFICE O/P EST MOD 30 MIN: CPT

## 2023-07-17 PROCEDURE — 93000 ELECTROCARDIOGRAM COMPLETE: CPT

## 2023-07-23 NOTE — PHYSICAL EXAM
[Well Developed] : well developed [Well Nourished] : well nourished [No Acute Distress] : no acute distress [Normal Venous Pressure] : normal venous pressure [Normal S1, S2] : normal S1, S2 [No Murmur] : no murmur [No Rub] : no rub [No Gallop] : no gallop [Clear Lung Fields] : clear lung fields [Good Air Entry] : good air entry [No Respiratory Distress] : no respiratory distress  [Soft] : abdomen soft [Non Tender] : non-tender [Normal Bowel Sounds] : normal bowel sounds [Normal Gait] : normal gait [No Edema] : no edema [No Cyanosis] : no cyanosis [No Clubbing] : no clubbing [Moves all extremities] : moves all extremities [Alert and Oriented] : alert and oriented [de-identified] : upper extremity tremors noted

## 2023-07-23 NOTE — REVIEW OF SYSTEMS
[Fever] : no fever [Chills] : no chills [Feeling Fatigued] : not feeling fatigued [Dyspnea on exertion] : not dyspnea during exertion [SOB] : no shortness of breath [Chest Discomfort] : no chest discomfort [Lower Ext Edema] : no extremity edema [Palpitations] : no palpitations [Syncope] : no syncope [Cough] : no cough [Wheezing] : no wheezing [Abdominal Pain] : no abdominal pain [Nausea] : no nausea [Vomiting] : no vomiting [Dizziness] : no dizziness [Weakness] : no weakness

## 2023-07-23 NOTE — DISCUSSION/SUMMARY
[FreeTextEntry1] : This is a 72 yo female with h/o HLD, PAF, non-obstructive CAD, Factor XI deficiency, Atach, s/p cryo ablation 4/2022 with recurrence. She is currently on medication management for the atrial tachycardia. She denies CP, SOB, palpitations, lightheadedness, dizziness, near syncope or syncope. She had no associated symptoms and no trigger.  Patient's been compliant her medications.  She is tolerating her medications well. She states she has a mechanical fall in May requiring evaluation in [place] [facility] [place]. She was evaluated and discharged. She does report recent evaluation by a new neurologist for her longstanding essential tremor who recommends changing from Metoprolol to Inderal. Today she is being seen in follow-up evaluation for prior SVT.  Patient is overall been doing well since last seen. Would continue current medication regimen. We discussed if there is a need to change from metoprolol to inderal for the tremors it is possible but dosing would need to considered. Would recommend obtaining a cardiac CT for calcium score in this patient on Flecainide. This was d/w the patient and it will be scheduled. She will f/u in the office for her next visit in 6 months. Questions were answered and the patient verbalized understanding. Thank you for involving us in the care of this pleasant patient.  [EKG obtained to assist in diagnosis and management of assessed problem(s)] : EKG obtained to assist in diagnosis and management of assessed problem(s)

## 2023-07-23 NOTE — HISTORY OF PRESENT ILLNESS
[FreeTextEntry1] : This is a 74 yo female with h/o HLD, PAF, non-obstructive CAD, Factor XI deficiency, Atach, s/p cryo ablation 4/2022 with recurrence. She is currently on medication management for the atrial tachycardia. She denies CP, SOB, palpitations, lightheadedness, dizziness, near syncope or syncope. She had no associated symptoms and no trigger.  Patient's been compliant her medications.  She is tolerating her medications well. She states she has a mechanical fall in May requiring evaluation in the [place] at [facility] [place]. She was evaluated and discharged. She does report recent evaluation by a new neurologist for her longstanding essential tremor who recommends changing from Metoprolol to Inderal. Today she is being seen in follow-up evaluation for prior SVT.  Patient is overall been doing well since last seen. \par \par She is currently on flecainide 150 mg twice daily, metoprolol succinate ER 50 mg daily and cardizem 180mg po QD in addition to her other medications.\par \par Previous EP study done April 2022:  she had an atrial tachycardia that was mapped close to the AV node region.  It was partially ablated.  She was at risk for heart block and cryoablation was used.  We ablated extremely close to note still had residual nonsustained atrial tachycardia.  Ablation in the close that would have probably resulted in heart block and was not done.  Because of the residual tachycardia she was sent home on flecainide.  \par \par Previous history.:  ER presentation to [facility] yesterday 3/15/2022.  She had presented with a tachycardia SVT short RP about 180 bpm.  Patient was on high-dose propafenone as well as metoprolol.   \par \par She has a history of factor XI deficiency.  \par \par On February 9 she caught an episode.  She was wearing the [employer] mobile device and recorded episodes at a rate of 150 bpm the P waves were not very distinct but appeared to be at end of the QRS and a distorted QRS as well suggesting some form of SVT.  The QRS complex was narrow.\par \par She has a past history of paroxysmal atrial fibrillation, dyslipidemia, factor XI deficiency, nonobstructive coronary arteries and a prior history of breast CA.\par Prior history of hypertension and borderline diabetes.\par She has a prior history of tremors and has been on propanolol.  The patient also has been on Rythmol metoprolol and anticoagulated with warfarin.\par \par Previous cardiac catheterization had showed nonobstructive coronary arteries in 2008.\par \par The report from her cardiac catheterization dated 12/18/2008 from [facility] in [place] was reviewed.  It indicated that she had atrial fibrillation with significant ST depression and borderline troponins.  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  She was given FFP prior to the procedure.  The findings on the cardiac catheterization showed that she had normal coronary arteries.  She was on carvedilol and the dose was increased to 25 mg twice daily.  When she was in rapid tachycardia she was given adenosine and it was reported after second dose of adenosine the arrhythmia had slowed and it was noted that she had underlying A. fib.  History of hypothyroidism on Synthroid was noted.  Carotid ultrasound was done then and showed less than 50% ICA stenosis bilaterally.  She had a prior history of right neck bruit.  Previous echo from 2008 that showed preserved left ventricular function and no significant valvular disease.\par \par Review of her previous test\par \par Echocardiogram done 10/9/2018 EF 60%, left atrial diameter 2.9 cm with mild minimal mitral regurgitation, left atrial volume index 24 cc/m², mild diastolic dysfunction, trace pericardial effusion, borderline pulmonary hypertension.\par \par She had a nuclear stress test performed 2/7/2022 regadenoson stress tests showing normal myocardial perfusion scan\par \par The patient had a monitor performed for 28 days starting 12/5/2018 21/6/19 reported intermittent paroxysmal atrial fibrillation.  I did not see the atrial fibrillation upon review and it appeared more of an atrial tachycardia with a slower ventricular response.\par \par \par \par Noted she had a prior history of breast CA status post mastectomy and chemotherapy in 1989.

## 2023-07-23 NOTE — CARDIOLOGY SUMMARY
[de-identified] : 7/17/2023 Sinus Bradycardia with 1st degree AV block 54bpm PA 270ms, QRS 124ms and QT 486ms

## 2023-08-03 ENCOUNTER — NON-APPOINTMENT (OUTPATIENT)
Age: 73
End: 2023-08-03

## 2023-08-04 ENCOUNTER — NON-APPOINTMENT (OUTPATIENT)
Age: 73
End: 2023-08-04

## 2023-08-11 ENCOUNTER — NON-APPOINTMENT (OUTPATIENT)
Age: 73
End: 2023-08-11

## 2023-09-12 ENCOUNTER — APPOINTMENT (OUTPATIENT)
Dept: CARDIOLOGY | Facility: CLINIC | Age: 73
End: 2023-09-12
Payer: MEDICARE

## 2023-09-12 VITALS
DIASTOLIC BLOOD PRESSURE: 68 MMHG | HEART RATE: 57 BPM | OXYGEN SATURATION: 96 % | SYSTOLIC BLOOD PRESSURE: 130 MMHG | WEIGHT: 140 LBS | BODY MASS INDEX: 23.9 KG/M2 | HEIGHT: 64 IN

## 2023-09-12 DIAGNOSIS — E78.5 HYPERLIPIDEMIA, UNSPECIFIED: ICD-10-CM

## 2023-09-12 DIAGNOSIS — I47.1 SUPRAVENTRICULAR TACHYCARDIA: ICD-10-CM

## 2023-09-12 DIAGNOSIS — Z86.79 OTHER SPECIFIED POSTPROCEDURAL STATES: ICD-10-CM

## 2023-09-12 DIAGNOSIS — R07.89 OTHER CHEST PAIN: ICD-10-CM

## 2023-09-12 DIAGNOSIS — F41.9 ANXIETY DISORDER, UNSPECIFIED: ICD-10-CM

## 2023-09-12 DIAGNOSIS — Z79.01 LONG TERM (CURRENT) USE OF ANTICOAGULANTS: ICD-10-CM

## 2023-09-12 DIAGNOSIS — Z98.890 OTHER SPECIFIED POSTPROCEDURAL STATES: ICD-10-CM

## 2023-09-12 DIAGNOSIS — I10 ESSENTIAL (PRIMARY) HYPERTENSION: ICD-10-CM

## 2023-09-12 PROCEDURE — 99215 OFFICE O/P EST HI 40 MIN: CPT

## 2023-09-12 RX ORDER — RISEDRONATE SODIUM 35 MG/1
35 TABLET, FILM COATED ORAL
Refills: 0 | Status: ACTIVE | COMMUNITY

## 2023-09-12 RX ORDER — LEVOTHYROXINE SODIUM 112 UG/1
112 CAPSULE ORAL
Refills: 0 | Status: DISCONTINUED | COMMUNITY
End: 2023-09-12

## 2023-09-18 ENCOUNTER — APPOINTMENT (OUTPATIENT)
Dept: OPHTHALMOLOGY | Facility: CLINIC | Age: 73
End: 2023-09-18
Payer: MEDICARE

## 2023-09-18 ENCOUNTER — NON-APPOINTMENT (OUTPATIENT)
Age: 73
End: 2023-09-18

## 2023-09-18 PROCEDURE — 92014 COMPRE OPH EXAM EST PT 1/>: CPT

## 2023-10-17 ENCOUNTER — RX RENEWAL (OUTPATIENT)
Age: 73
End: 2023-10-17

## 2023-11-18 ENCOUNTER — NON-APPOINTMENT (OUTPATIENT)
Age: 73
End: 2023-11-18

## 2024-02-02 ENCOUNTER — APPOINTMENT (OUTPATIENT)
Dept: ELECTROPHYSIOLOGY | Facility: CLINIC | Age: 74
End: 2024-02-02
Payer: MEDICARE

## 2024-02-02 VITALS
HEART RATE: 50 BPM | WEIGHT: 140 LBS | BODY MASS INDEX: 23.9 KG/M2 | HEIGHT: 64 IN | OXYGEN SATURATION: 99 % | DIASTOLIC BLOOD PRESSURE: 70 MMHG | SYSTOLIC BLOOD PRESSURE: 138 MMHG

## 2024-02-02 DIAGNOSIS — I48.0 PAROXYSMAL ATRIAL FIBRILLATION: ICD-10-CM

## 2024-02-02 DIAGNOSIS — R06.02 SHORTNESS OF BREATH: ICD-10-CM

## 2024-02-02 PROCEDURE — 99214 OFFICE O/P EST MOD 30 MIN: CPT

## 2024-02-02 PROCEDURE — 93000 ELECTROCARDIOGRAM COMPLETE: CPT

## 2024-02-02 RX ORDER — BUDESONIDE AND FORMOTEROL FUMARATE DIHYDRATE 160; 4.5 UG/1; UG/1
160-4.5 AEROSOL RESPIRATORY (INHALATION)
Refills: 0 | Status: DISCONTINUED | COMMUNITY
End: 2024-02-02

## 2024-02-02 RX ORDER — LEVOTHYROXINE SODIUM 0.11 MG/1
112 TABLET ORAL
Qty: 1 | Refills: 0 | Status: ACTIVE | COMMUNITY
Start: 2019-01-30

## 2024-02-02 RX ORDER — PRIMIDONE 250 MG/1
250 TABLET ORAL
Refills: 0 | Status: ACTIVE | COMMUNITY

## 2024-02-02 RX ORDER — PREDNISONE 10 MG/1
10 TABLET ORAL DAILY
Refills: 0 | Status: ACTIVE | COMMUNITY

## 2024-02-03 LAB — HBA1C MFR BLD HPLC: 5.5

## 2024-02-05 RX ORDER — DILTIAZEM HYDROCHLORIDE 180 MG/1
180 TABLET, EXTENDED RELEASE ORAL DAILY
Qty: 90 | Refills: 1 | Status: ACTIVE | COMMUNITY
Start: 1900-01-01 | End: 1900-01-01

## 2024-02-19 PROBLEM — I48.0 PAF (PAROXYSMAL ATRIAL FIBRILLATION): Status: ACTIVE | Noted: 2021-11-16

## 2024-02-19 PROBLEM — R06.02 BREATH SHORTNESS: Status: ACTIVE | Noted: 2019-01-30

## 2024-02-19 NOTE — PHYSICAL EXAM
[No Acute Distress] : no acute distress [Normal Venous Pressure] : normal venous pressure [Normal S1, S2] : normal S1, S2 [No Murmur] : no murmur [Clear Lung Fields] : clear lung fields [Normal Gait] : normal gait [No Edema] : no edema [No Rash] : no rash [No Focal Deficits] : no focal deficits [Alert and Oriented] : alert and oriented [de-identified] : Conjunctival hemorrhage

## 2024-02-19 NOTE — REVIEW OF SYSTEMS
[Tremor] : a tremor was seen [Depression] : depression [Anxiety] : anxiety [Under Stress] : under stress [Easy Bleeding] : a tendency for easy bleeding [Fever] : no fever [Weight Gain (___ Lbs)] : no recent weight gain [Feeling Fatigued] : not feeling fatigued [Sore Throat] : no sore throat [SOB] : no shortness of breath [Chest Discomfort] : no chest discomfort [Palpitations] : no palpitations [Cough] : no cough [Abdominal Pain] : no abdominal pain [Rash] : no rash [Dizziness] : no dizziness

## 2024-02-19 NOTE — DISCUSSION/SUMMARY
[FreeTextEntry1] : Previous EP study done April 2022:  she had an atrial tachycardia that was mapped close to the AV node region.  It was partially ablated.  She was at risk for heart block and cryoablation was used.  We ablated extremely close to node still had residual nonsustained atrial tachycardia.  Ablation in the close that would have probably resulted in heart block and was not done.  Because of the residual tachycardia she was sent home on flecainide. SVT - controlled on current medications flecainide  and metoprolol  She has a history of factor Xi deficiency - no bleeding issues She has been on  Warfarin ( AF) Followup in  8  months.  [EKG obtained to assist in diagnosis and management of assessed problem(s)] : EKG obtained to assist in diagnosis and management of assessed problem(s)

## 2024-02-19 NOTE — HISTORY OF PRESENT ILLNESS
[FreeTextEntry1] : Is a 73-year-old woman who is seen in follow-up evaluation for previous SVT.  Overall she has been doing well she is on flecainide as well as metoprolol and has not been getting any recurrent episodes of tachycardia.  Seems to be tolerating the medicine well.  Her main issue is her breathing at times especially as it relates to her history of asthma. She is currently on flecainide 150 mg twice daily as well as metoprolol succinate ER 50 mg daily in addition to her other medications.  Previous EP study done April 2022:  she had an atrial tachycardia that was mapped close to the AV node region.  It was partially ablated.  She was at risk for heart block and cryoablation was used.  We ablated extremely close to node still had residual nonsustained atrial tachycardia.  Ablation in the close that would have probably resulted in heart block and was not done.  Because of the residual tachycardia she was sent home on flecainide.    Previous history.:  ER presentation to [facility] yesterday 3/15/2022.  She had presented with a tachycardia SVT short RP about 180 bpm.  Patient was on high-dose propafenone as well as metoprolol.     She has a past history of paroxysmal atrial fibrillation, dyslipidemia, factor XI deficiency, nonobstructive coronary arteries and a prior history of breast CA. Prior history of hypertension and borderline diabetes. She has a prior history of tremors and has been on propanolol.  The patient also has been on Rythmol metoprolol and anticoagulated with warfarin.  Previous cardiac catheterization had showed nonobstructive coronary arteries in 2008.  The report from her cardiac catheterization dated 12/18/2008 from [facility] in [place] was reviewed.  It indicated that she had atrial fibrillation with significant ST depression and borderline troponins.  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  She was given FFP prior to the procedure.  The findings on the cardiac catheterization showed that she had normal coronary arteries.  She was on carvedilol and the dose was increased to 25 mg twice daily.  When she was in rapid tachycardia she was given adenosine and it was reported after second dose of adenosine the arrhythmia had slowed and it was noted that she had underlying A. fib.  History of hypothyroidism on Synthroid was noted.  Carotid ultrasound was done then and showed less than 50% ICA stenosis bilaterally.  She had a prior history of right neck bruit.  Previous echo from 2008 that showed preserved left ventricular function and no significant valvular disease.  Echocardiogram done 10/9/2018 EF 60%, left atrial diameter 2.9 cm with mild minimal mitral regurgitation, left atrial volume index 24 cc/m, mild diastolic dysfunction, trace pericardial effusion, borderline pulmonary hypertension.  She had a nuclear stress test performed 2/7/2022 regadenoson stress tests showing normal myocardial perfusion scan  The patient had a monitor performed for 28 days starting 12/5/2018 21/6/19 reported intermittent paroxysmal atrial fibrillation.  I did not see the atrial fibrillation upon review and it appeared more of an atrial tachycardia with a slower ventricular response.    Noted she had a prior history of breast CA status post mastectomy and chemotherapy in 1989.

## 2024-04-03 ENCOUNTER — RX RENEWAL (OUTPATIENT)
Age: 74
End: 2024-04-03

## 2024-04-03 RX ORDER — METOPROLOL SUCCINATE 50 MG/1
50 TABLET, EXTENDED RELEASE ORAL
Qty: 180 | Refills: 3 | Status: ACTIVE | COMMUNITY
Start: 2022-10-24 | End: 1900-01-01

## 2024-05-20 ENCOUNTER — RX RENEWAL (OUTPATIENT)
Age: 74
End: 2024-05-20

## 2024-05-22 RX ORDER — FLECAINIDE ACETATE 150 MG/1
150 TABLET ORAL
Qty: 180 | Refills: 0 | Status: ACTIVE | COMMUNITY
Start: 1900-01-01 | End: 1900-01-01

## 2024-07-17 ENCOUNTER — NON-APPOINTMENT (OUTPATIENT)
Age: 74
End: 2024-07-17

## 2024-07-17 ENCOUNTER — APPOINTMENT (OUTPATIENT)
Dept: OPHTHALMOLOGY | Facility: CLINIC | Age: 74
End: 2024-07-17
Payer: MEDICARE

## 2024-07-17 PROCEDURE — 92014 COMPRE OPH EXAM EST PT 1/>: CPT

## 2024-08-22 ENCOUNTER — APPOINTMENT (OUTPATIENT)
Dept: OPHTHALMOLOGY | Facility: CLINIC | Age: 74
End: 2024-08-22
Payer: MEDICARE

## 2024-08-22 ENCOUNTER — NON-APPOINTMENT (OUTPATIENT)
Age: 74
End: 2024-08-22

## 2024-08-22 PROCEDURE — 66821 AFTER CATARACT LASER SURGERY: CPT | Mod: RT

## 2024-09-26 ENCOUNTER — NON-APPOINTMENT (OUTPATIENT)
Age: 74
End: 2024-09-26

## 2024-09-26 ENCOUNTER — APPOINTMENT (OUTPATIENT)
Dept: OPHTHALMOLOGY | Facility: CLINIC | Age: 74
End: 2024-09-26
Payer: MEDICARE

## 2024-09-26 PROCEDURE — 99024 POSTOP FOLLOW-UP VISIT: CPT

## 2024-10-16 ENCOUNTER — NON-APPOINTMENT (OUTPATIENT)
Age: 74
End: 2024-10-16

## 2024-10-16 ENCOUNTER — APPOINTMENT (OUTPATIENT)
Dept: ELECTROPHYSIOLOGY | Facility: CLINIC | Age: 74
End: 2024-10-16
Payer: MEDICARE

## 2024-10-16 VITALS
SYSTOLIC BLOOD PRESSURE: 136 MMHG | BODY MASS INDEX: 23.9 KG/M2 | HEART RATE: 54 BPM | HEIGHT: 64 IN | DIASTOLIC BLOOD PRESSURE: 68 MMHG | OXYGEN SATURATION: 97 % | WEIGHT: 140 LBS

## 2024-10-16 DIAGNOSIS — Z79.01 LONG TERM (CURRENT) USE OF ANTICOAGULANTS: ICD-10-CM

## 2024-10-16 DIAGNOSIS — R00.1 BRADYCARDIA, UNSPECIFIED: ICD-10-CM

## 2024-10-16 PROCEDURE — 93000 ELECTROCARDIOGRAM COMPLETE: CPT

## 2024-10-16 PROCEDURE — 99214 OFFICE O/P EST MOD 30 MIN: CPT

## 2024-11-15 ENCOUNTER — RX RENEWAL (OUTPATIENT)
Age: 74
End: 2024-11-15

## 2024-12-05 ENCOUNTER — APPOINTMENT (OUTPATIENT)
Dept: CARDIOLOGY | Facility: CLINIC | Age: 74
End: 2024-12-05
Payer: MEDICARE

## 2024-12-05 VITALS
DIASTOLIC BLOOD PRESSURE: 82 MMHG | WEIGHT: 140 LBS | SYSTOLIC BLOOD PRESSURE: 132 MMHG | BODY MASS INDEX: 23.9 KG/M2 | OXYGEN SATURATION: 96 % | HEART RATE: 55 BPM | HEIGHT: 64 IN

## 2024-12-05 DIAGNOSIS — E78.5 HYPERLIPIDEMIA, UNSPECIFIED: ICD-10-CM

## 2024-12-05 DIAGNOSIS — Z79.01 LONG TERM (CURRENT) USE OF ANTICOAGULANTS: ICD-10-CM

## 2024-12-05 DIAGNOSIS — J45.909 UNSPECIFIED ASTHMA, UNCOMPLICATED: ICD-10-CM

## 2024-12-05 DIAGNOSIS — Z98.890 OTHER SPECIFIED POSTPROCEDURAL STATES: ICD-10-CM

## 2024-12-05 DIAGNOSIS — I47.10 SUPRAVENTRICULAR TACHYCARDIA, UNSPECIFIED: ICD-10-CM

## 2024-12-05 DIAGNOSIS — Z86.79 OTHER SPECIFIED POSTPROCEDURAL STATES: ICD-10-CM

## 2024-12-05 DIAGNOSIS — I10 ESSENTIAL (PRIMARY) HYPERTENSION: ICD-10-CM

## 2024-12-05 PROCEDURE — 99215 OFFICE O/P EST HI 40 MIN: CPT

## 2024-12-05 RX ORDER — FEXOFENADINE HYDROCHLORIDE 180 MG/1
180 TABLET ORAL
Refills: 0 | Status: ACTIVE | COMMUNITY

## 2025-01-27 ENCOUNTER — APPOINTMENT (OUTPATIENT)
Dept: OPHTHALMOLOGY | Facility: CLINIC | Age: 75
End: 2025-01-27

## 2025-02-05 ENCOUNTER — RX RENEWAL (OUTPATIENT)
Age: 75
End: 2025-02-05

## 2025-02-05 RX ORDER — DILTIAZEM HYDROCHLORIDE 180 MG/1
180 CAPSULE, EXTENDED RELEASE ORAL
Qty: 90 | Refills: 3 | Status: ACTIVE | COMMUNITY
Start: 2025-02-05 | End: 1900-01-01

## 2025-02-19 ENCOUNTER — APPOINTMENT (OUTPATIENT)
Dept: CARDIOLOGY | Facility: CLINIC | Age: 75
End: 2025-02-19
Payer: MEDICARE

## 2025-02-19 VITALS
BODY MASS INDEX: 23.9 KG/M2 | OXYGEN SATURATION: 97 % | HEIGHT: 64 IN | HEART RATE: 76 BPM | DIASTOLIC BLOOD PRESSURE: 74 MMHG | SYSTOLIC BLOOD PRESSURE: 122 MMHG | WEIGHT: 140 LBS

## 2025-02-19 DIAGNOSIS — I47.10 SUPRAVENTRICULAR TACHYCARDIA, UNSPECIFIED: ICD-10-CM

## 2025-02-19 PROCEDURE — 99214 OFFICE O/P EST MOD 30 MIN: CPT

## 2025-02-19 RX ORDER — MULTIVIT-MIN/IRON/FOLIC ACID/K 18-600-40
400 CAPSULE ORAL
Refills: 0 | Status: ACTIVE | COMMUNITY

## 2025-02-19 RX ORDER — LEVOTHYROXINE SODIUM 137 UG/1
TABLET ORAL
Refills: 0 | Status: ACTIVE | COMMUNITY

## 2025-03-10 ENCOUNTER — APPOINTMENT (OUTPATIENT)
Dept: OPHTHALMOLOGY | Facility: CLINIC | Age: 75
End: 2025-03-10
Payer: MEDICARE

## 2025-03-10 ENCOUNTER — NON-APPOINTMENT (OUTPATIENT)
Age: 75
End: 2025-03-10

## 2025-03-10 PROCEDURE — 92014 COMPRE OPH EXAM EST PT 1/>: CPT

## 2025-03-17 NOTE — HISTORY OF PRESENT ILLNESS
Called patient in regards to his labs that as order on his last visit with To no answer LVM for patient to please have labs done prior to his appt.   [FreeTextEntry1] : 68 year old female with history of PAF, HLD, Factor XI deficiency, non-obstructive CAD, prior history of breast cancer presents for follow up. She has no chest pain, SOB, palpitations, abnormal bleeding.

## 2025-03-28 ENCOUNTER — NON-APPOINTMENT (OUTPATIENT)
Age: 75
End: 2025-03-28

## 2025-03-28 ENCOUNTER — APPOINTMENT (OUTPATIENT)
Dept: OPHTHALMOLOGY | Facility: CLINIC | Age: 75
End: 2025-03-28
Payer: MEDICARE

## 2025-03-28 PROCEDURE — 66821 AFTER CATARACT LASER SURGERY: CPT | Mod: LT

## 2025-04-24 ENCOUNTER — APPOINTMENT (OUTPATIENT)
Dept: OPHTHALMOLOGY | Facility: CLINIC | Age: 75
End: 2025-04-24
Payer: MEDICARE

## 2025-04-24 ENCOUNTER — NON-APPOINTMENT (OUTPATIENT)
Age: 75
End: 2025-04-24

## 2025-04-24 PROCEDURE — 99024 POSTOP FOLLOW-UP VISIT: CPT

## 2025-05-16 ENCOUNTER — RX RENEWAL (OUTPATIENT)
Age: 75
End: 2025-05-16

## 2025-06-05 ENCOUNTER — APPOINTMENT (OUTPATIENT)
Dept: CARDIOLOGY | Facility: CLINIC | Age: 75
End: 2025-06-05
Payer: MEDICARE

## 2025-06-05 VITALS
HEIGHT: 64 IN | DIASTOLIC BLOOD PRESSURE: 72 MMHG | BODY MASS INDEX: 24.24 KG/M2 | WEIGHT: 142 LBS | OXYGEN SATURATION: 98 % | SYSTOLIC BLOOD PRESSURE: 130 MMHG | HEART RATE: 55 BPM

## 2025-06-05 DIAGNOSIS — E78.5 HYPERLIPIDEMIA, UNSPECIFIED: ICD-10-CM

## 2025-06-05 DIAGNOSIS — Z79.899 OTHER LONG TERM (CURRENT) DRUG THERAPY: ICD-10-CM

## 2025-06-05 DIAGNOSIS — I47.10 SUPRAVENTRICULAR TACHYCARDIA, UNSPECIFIED: ICD-10-CM

## 2025-06-05 DIAGNOSIS — F41.9 ANXIETY DISORDER, UNSPECIFIED: ICD-10-CM

## 2025-06-05 DIAGNOSIS — F32.A DEPRESSION, UNSPECIFIED: ICD-10-CM

## 2025-06-05 DIAGNOSIS — I10 ESSENTIAL (PRIMARY) HYPERTENSION: ICD-10-CM

## 2025-06-05 DIAGNOSIS — Z79.01 LONG TERM (CURRENT) USE OF ANTICOAGULANTS: ICD-10-CM

## 2025-06-05 DIAGNOSIS — J45.909 UNSPECIFIED ASTHMA, UNCOMPLICATED: ICD-10-CM

## 2025-06-05 DIAGNOSIS — Z86.79 OTHER SPECIFIED POSTPROCEDURAL STATES: ICD-10-CM

## 2025-06-05 DIAGNOSIS — Z98.890 OTHER SPECIFIED POSTPROCEDURAL STATES: ICD-10-CM

## 2025-06-05 DIAGNOSIS — R07.89 OTHER CHEST PAIN: ICD-10-CM

## 2025-06-05 LAB — HBA1C MFR BLD HPLC: 5.7

## 2025-06-05 PROCEDURE — 99215 OFFICE O/P EST HI 40 MIN: CPT

## 2025-06-05 PROCEDURE — 93000 ELECTROCARDIOGRAM COMPLETE: CPT

## 2025-06-05 RX ORDER — BUDESONIDE AND FORMOTEROL FUMARATE DIHYDRATE 160; 4.5 UG/1; UG/1
160-4.5 AEROSOL RESPIRATORY (INHALATION)
Refills: 0 | Status: ACTIVE | COMMUNITY

## 2025-06-05 RX ORDER — CALCITONIN SALMON 200 [IU]/1
200 SPRAY, METERED NASAL
Refills: 0 | Status: ACTIVE | COMMUNITY

## 2025-06-05 RX ORDER — PRIMIDONE 250 MG/1
250 TABLET ORAL DAILY
Refills: 0 | Status: ACTIVE | COMMUNITY

## 2025-06-05 RX ORDER — LEVOTHYROXINE SODIUM 175 UG/1
175 CAPSULE ORAL DAILY
Refills: 0 | Status: ACTIVE | COMMUNITY

## 2025-07-18 ENCOUNTER — APPOINTMENT (OUTPATIENT)
Dept: ELECTROPHYSIOLOGY | Facility: CLINIC | Age: 75
End: 2025-07-18
Payer: MEDICARE

## 2025-07-18 VITALS
HEART RATE: 60 BPM | DIASTOLIC BLOOD PRESSURE: 64 MMHG | HEIGHT: 64 IN | OXYGEN SATURATION: 97 % | BODY MASS INDEX: 23.9 KG/M2 | WEIGHT: 140 LBS | SYSTOLIC BLOOD PRESSURE: 116 MMHG

## 2025-07-18 PROCEDURE — 93000 ELECTROCARDIOGRAM COMPLETE: CPT

## 2025-07-18 PROCEDURE — 99214 OFFICE O/P EST MOD 30 MIN: CPT

## 2025-07-18 RX ORDER — METOPROLOL SUCCINATE 25 MG/1
25 TABLET, EXTENDED RELEASE ORAL
Qty: 120 | Refills: 1 | Status: ACTIVE | COMMUNITY
Start: 1900-01-01 | End: 1900-01-01

## 2025-08-08 ENCOUNTER — NON-APPOINTMENT (OUTPATIENT)
Age: 75
End: 2025-08-08